# Patient Record
Sex: MALE | Race: WHITE | Employment: OTHER | URBAN - METROPOLITAN AREA
[De-identification: names, ages, dates, MRNs, and addresses within clinical notes are randomized per-mention and may not be internally consistent; named-entity substitution may affect disease eponyms.]

---

## 2022-10-15 ENCOUNTER — HOSPITAL ENCOUNTER (EMERGENCY)
Age: 67
Discharge: HOME | End: 2022-10-15
Attending: EMERGENCY MEDICINE
Payer: COMMERCIAL

## 2022-10-15 ENCOUNTER — APPOINTMENT (OUTPATIENT)
Dept: GENERAL RADIOLOGY | Age: 67
End: 2022-10-15
Payer: COMMERCIAL

## 2022-10-15 VITALS
HEIGHT: 69 IN | HEART RATE: 87 BPM | DIASTOLIC BLOOD PRESSURE: 79 MMHG | WEIGHT: 180 LBS | SYSTOLIC BLOOD PRESSURE: 96 MMHG | BODY MASS INDEX: 26.66 KG/M2 | TEMPERATURE: 98.5 F | OXYGEN SATURATION: 98 % | RESPIRATION RATE: 16 BRPM

## 2022-10-15 DIAGNOSIS — N18.9 CHRONIC KIDNEY DISEASE, UNSPECIFIED CKD STAGE: ICD-10-CM

## 2022-10-15 DIAGNOSIS — R00.0 SINUS TACHYCARDIA BY ELECTROCARDIOGRAM: ICD-10-CM

## 2022-10-15 DIAGNOSIS — R00.2 PALPITATIONS: Primary | ICD-10-CM

## 2022-10-15 LAB
ALBUMIN SERPL-MCNC: 3.8 GM/DL (ref 3.4–5)
ALP BLD-CCNC: 44 IU/L (ref 40–129)
ALT SERPL-CCNC: 14 U/L (ref 10–40)
ANION GAP SERPL CALCULATED.3IONS-SCNC: 12 MMOL/L (ref 4–16)
AST SERPL-CCNC: 21 IU/L (ref 15–37)
BASOPHILS ABSOLUTE: 0.1 K/CU MM
BASOPHILS RELATIVE PERCENT: 0.9 % (ref 0–1)
BILIRUB SERPL-MCNC: 0.3 MG/DL (ref 0–1)
BUN BLDV-MCNC: 45 MG/DL (ref 6–23)
CALCIUM SERPL-MCNC: 9.5 MG/DL (ref 8.3–10.6)
CHLORIDE BLD-SCNC: 101 MMOL/L (ref 99–110)
CO2: 22 MMOL/L (ref 21–32)
CREAT SERPL-MCNC: 1.9 MG/DL (ref 0.9–1.3)
D DIMER: 251 NG/ML(DDU)
DIFFERENTIAL TYPE: ABNORMAL
EOSINOPHILS ABSOLUTE: 0.2 K/CU MM
EOSINOPHILS RELATIVE PERCENT: 2.6 % (ref 0–3)
GFR AFRICAN AMERICAN: 43 ML/MIN/1.73M2
GFR NON-AFRICAN AMERICAN: 35 ML/MIN/1.73M2
GLUCOSE BLD-MCNC: 178 MG/DL (ref 70–99)
HCT VFR BLD CALC: 35.4 % (ref 42–52)
HEMOGLOBIN: 11.6 GM/DL (ref 13.5–18)
IMMATURE NEUTROPHIL %: 0.3 % (ref 0–0.43)
LYMPHOCYTES ABSOLUTE: 1.7 K/CU MM
LYMPHOCYTES RELATIVE PERCENT: 25.7 % (ref 24–44)
MAGNESIUM: 2.5 MG/DL (ref 1.8–2.4)
MCH RBC QN AUTO: 31.3 PG (ref 27–31)
MCHC RBC AUTO-ENTMCNC: 32.8 % (ref 32–36)
MCV RBC AUTO: 95.4 FL (ref 78–100)
MONOCYTES ABSOLUTE: 0.5 K/CU MM
MONOCYTES RELATIVE PERCENT: 7.8 % (ref 0–4)
NUCLEATED RBC %: 0 %
PDW BLD-RTO: 14.6 % (ref 11.7–14.9)
PLATELET # BLD: 207 K/CU MM (ref 140–440)
PMV BLD AUTO: 11.3 FL (ref 7.5–11.1)
POTASSIUM SERPL-SCNC: 4.2 MMOL/L (ref 3.5–5.1)
RBC # BLD: 3.71 M/CU MM (ref 4.6–6.2)
SEGMENTED NEUTROPHILS ABSOLUTE COUNT: 4 K/CU MM
SEGMENTED NEUTROPHILS RELATIVE PERCENT: 62.7 % (ref 36–66)
SODIUM BLD-SCNC: 135 MMOL/L (ref 135–145)
T4 FREE: 1.26 NG/DL (ref 0.9–1.8)
TOTAL CK: 66 IU/L (ref 38–174)
TOTAL IMMATURE NEUTOROPHIL: 0.02 K/CU MM
TOTAL NUCLEATED RBC: 0 K/CU MM
TOTAL PROTEIN: 6.9 GM/DL (ref 6.4–8.2)
TROPONIN T: <0.01 NG/ML
TSH HIGH SENSITIVITY: 2.67 UIU/ML (ref 0.27–4.2)
WBC # BLD: 6.4 K/CU MM (ref 4–10.5)

## 2022-10-15 PROCEDURE — 99285 EMERGENCY DEPT VISIT HI MDM: CPT

## 2022-10-15 PROCEDURE — 80053 COMPREHEN METABOLIC PANEL: CPT

## 2022-10-15 PROCEDURE — 85025 COMPLETE CBC W/AUTO DIFF WBC: CPT

## 2022-10-15 PROCEDURE — 83735 ASSAY OF MAGNESIUM: CPT

## 2022-10-15 PROCEDURE — 84443 ASSAY THYROID STIM HORMONE: CPT

## 2022-10-15 PROCEDURE — 82550 ASSAY OF CK (CPK): CPT

## 2022-10-15 PROCEDURE — 84439 ASSAY OF FREE THYROXINE: CPT

## 2022-10-15 PROCEDURE — 71046 X-RAY EXAM CHEST 2 VIEWS: CPT

## 2022-10-15 PROCEDURE — 85379 FIBRIN DEGRADATION QUANT: CPT

## 2022-10-15 PROCEDURE — 93005 ELECTROCARDIOGRAM TRACING: CPT | Performed by: PHYSICIAN ASSISTANT

## 2022-10-15 PROCEDURE — 84484 ASSAY OF TROPONIN QUANT: CPT

## 2022-10-15 RX ORDER — 0.9 % SODIUM CHLORIDE 0.9 %
1000 INTRAVENOUS SOLUTION INTRAVENOUS ONCE
Status: DISCONTINUED | OUTPATIENT
Start: 2022-10-15 | End: 2022-10-15 | Stop reason: HOSPADM

## 2022-10-15 NOTE — ED TRIAGE NOTES
Patient comes to the ED stating that today he experienced incidents of irregular heartbeat today. Patient denies any chest pain or tightness but states that he does get short of breath and have occasional dizziness/lightheadedness.

## 2022-10-15 NOTE — ED PROVIDER NOTES
I independently examined and evaluated Faith Allen. I personally saw the patient and performed a substantive portion of the visit including all aspects of the medical decision making. In brief their history revealed patient has been dealing with episodes of rapid heart rate for the past several months. Patient reports that occasionally he will feel his heartbeat very rapidly followed by some pauses and forceful heartbeats. These episodes can occur randomly and are not brought on by anything specifically that he is aware of. Patient does experience occasional lightheadedness with these but no chest pain. No shortness of breath currently but occasionally with the episodes as well. Patient reports that this evening she was feeling lightheaded with the rapid heart beating and he mentioned this to his wife who had never discussed this with before and she brought him in for evaluation. Patient does report that his symptoms before arrival improved with smoking a cigarette. Patient denies any significant caffeine use other than 2 cups of coffee in the morning. No substance abuse issues. No recent illnesses. Patient does follow with a cardiologist although has been number of years. Their focused exam revealed the patient is afebrile, tachycardic otherwise hemodynamically stable on room air. The patient appears age appropriate, appears well-hydrated, well-nourished. Sitting upright in bed. Very pleasant. [address] injury. Mucous membranes are moist. Speech is clear. Breathing is unlabored. Speaking clearly in full sentences. No respiratory distress. Skin is dry. Mental status is normal. The patient moves all extremities and is without facial droop. No bilateral lower extremity edema. No calf tenderness to palpation. Strong symmetric bilateral radial and PT pulses.     Results for orders placed or performed during the hospital encounter of 10/15/22   CBC with Auto Differential   Result Value Ref Range    WBC 6.4 4.0 - 10.5 K/CU MM    RBC 3.71 (L) 4.6 - 6.2 M/CU MM    Hemoglobin 11.6 (L) 13.5 - 18.0 GM/DL    Hematocrit 35.4 (L) 42 - 52 %    MCV 95.4 78 - 100 FL    MCH 31.3 (H) 27 - 31 PG    MCHC 32.8 32.0 - 36.0 %    RDW 14.6 11.7 - 14.9 %    Platelets 440 258 - 178 K/CU MM    MPV 11.3 (H) 7.5 - 11.1 FL    Differential Type AUTOMATED DIFFERENTIAL     Segs Relative 62.7 36 - 66 %    Lymphocytes % 25.7 24 - 44 %    Monocytes % 7.8 (H) 0 - 4 %    Eosinophils % 2.6 0 - 3 %    Basophils % 0.9 0 - 1 %    Segs Absolute 4.0 K/CU MM    Lymphocytes Absolute 1.7 K/CU MM    Monocytes Absolute 0.5 K/CU MM    Eosinophils Absolute 0.2 K/CU MM    Basophils Absolute 0.1 K/CU MM    Nucleated RBC % 0.0 %    Total Nucleated RBC 0.0 K/CU MM    Total Immature Neutrophil 0.02 K/CU MM    Immature Neutrophil % 0.3 0 - 0.43 %   Comprehensive Metabolic Panel   Result Value Ref Range    Sodium 135 135 - 145 MMOL/L    Potassium 4.2 3.5 - 5.1 MMOL/L    Chloride 101 99 - 110 mMol/L    CO2 22 21 - 32 MMOL/L    BUN 45 (H) 6 - 23 MG/DL    Creatinine 1.9 (H) 0.9 - 1.3 MG/DL    Glucose 178 (H) 70 - 99 MG/DL    Calcium 9.5 8.3 - 10.6 MG/DL    Albumin 3.8 3.4 - 5.0 GM/DL    Total Protein 6.9 6.4 - 8.2 GM/DL    Total Bilirubin 0.3 0.0 - 1.0 MG/DL    ALT 14 10 - 40 U/L    AST 21 15 - 37 IU/L    Alkaline Phosphatase 44 40 - 129 IU/L    GFR Non- 35 (L) >60 mL/min/1.73m2    GFR  43 (L) >60 mL/min/1.73m2    Anion Gap 12 4 - 16   Troponin   Result Value Ref Range    Troponin T <0.010 <0.01 NG/ML   Magnesium   Result Value Ref Range    Magnesium 2.5 (H) 1.8 - 2.4 mg/dl   CK   Result Value Ref Range    Total CK 66 38 - 174 IU/L   D-Dimer, Quantitative   Result Value Ref Range    D-Dimer, Quant 251 (H) <230 NG/mL(DDU)   T4, Free   Result Value Ref Range    T4 Free 1.26 0.9 - 1.8 NG/DL   TSH   Result Value Ref Range    TSH, High Sensitivity 2.670 0.270 - 4.20 uIu/ml   EKG 12 Lead   Result Value Ref Range Ventricular Rate 109 BPM    Atrial Rate 109 BPM    P-R Interval 134 ms    QRS Duration 68 ms    Q-T Interval 300 ms    QTc Calculation (Bazett) 404 ms    P Axis 85 degrees    R Axis 73 degrees    T Axis 77 degrees    Diagnosis       Sinus tachycardia  Right atrial enlargement  Borderline ECG  No previous ECGs available           12 lead EKG per my interpretation:  Sinus Tachycardia at 109  Axis is   Normal  QTc is  normal  There is no specific T wave changes appreciated. There is no specific ST wave changes appreciated. No STEMI    Prior EKG to compare with was NOT available. ED course: Pt presents as above. Emergent conditions considered. Presentation prompted initial broad work-up with labs, EKG and imaging. EKG with sinus tachycardia as above. CBC is with a mild anemia. No leukocytosis. D-dimer is elevated at 251 however this is not suggestive of thromboembolic disease per age-adjusted criteria. CMP is without clinically significant derangement other than mild hyperglycemia without metabolic acidosis. The patient does have chronic kidney disease with BUN of 45 and creatinine of 1.9 with prior creatinine several years ago was at 1.7 per care everywhere. Magnesium is marginally elevated. TSH and free T4 within normal limits. Patient is with normal heart rate on rechecks here in the emergency department. No dysrhythmias on telemetry monitoring. I do believe patient would benefit from Holter monitoring with suspicion for either paroxysmal atrial fibrillation or frequent PVCs/PACs. Both of these processes are discussed with patient as well as the need for follow-up with cardiology and he and his wife are agreeable with this plan. I did encourage patient to limit any caffeine use or any stimulant use. Smoking cessation counseling also provided.   Encourage patient to monitor symptoms closely and return to the emergency department for any chest pain or increasing shortness of breath or other new concerning symptoms. Questions sought and answered with the patient and wife. They voice understanding and agree with plan. Instructed to return for any worsening or worrisome concerns. Is this patient to be included in the SEP-1 Core Measure due to severe sepsis or septic shock? No   Exclusion criteria - the patient is NOT to be included for SEP-1 Core Measure due to: Infection is not suspected        All decisions regarding differential diagnosis, lab/radiology/EKG interpretation, risk of significant illness, specific reasons for performing tests, management/treatment, response to management/treatment, disposition, reasons for consults, results of consults, etc. were made by myself in conjunction with the Advanced Practice Provider. For all further details of the patient's emergency department visit, please see the Advanced Practice Provider's documentation.        Susanne Carrero MD  10/15/22 3005

## 2022-10-15 NOTE — ED PROVIDER NOTES
[address] ENCOUNTER        Pt Name: Aydin Trujillo  MRN: [id number]  [place] 1955  Date of evaluation: 10/15/2022  Provider: KAREN Molina CNP  PCP: No primary care provider on file. I have seen and evaluated this patient with my supervising physician Tim Kennedy MD.      Mert ABBOTT [address].       Chief Complaint   Patient presents with    Irregular Heart Beat     States he has had an irregular heart beat all day         HISTORY OF PRESENT ILLNESS      Chief Complaint: Rapid heart rate    Aydin Trujillo is a 69 y.o. male who presents for evaluation of intermittent palpitations that have been occurring this afternoon. Patient reports he felt like his heart was beating very rapidly and noticed on his apple watch that his heart rate was going up to 130. He states that it lasted about 1 hour and then drifted down but continued to be over 100. He reports some mild associated shortness of breath but denies any chest pain, dizziness, lightheadedness. He reports he is otherwise been feeling well recently and has had no other symptoms of illness. He has no prior cardiac history. He had a negative stress test last 3 years ago. He had 1 prior episode of similar symptoms about 6 weeks ago that resolved spontaneously. Nursing Notes were all reviewed and agreed with or any disagreements were addressed in the HPI.     REVIEW OF SYSTEMS     Constitutional:   Denies fever, chills, weight loss or weakness   HENT:   Denies sore throat or ear pain   Cardiovascular:   Denies chest pain, + palpitations   Respiratory:  Denies cough, + mild shortness of breath    GI:   Denies abdominal pain, nausea, vomiting, or diarrhea  :  Denies any urinary symptoms   Musculoskeletal:   Denies neck, back, or extremity pain  Skin:   Denies rash  Neurologic:   Denies headache, focal weakness or sensory changes   Endocrine:  Denies polyuria or polydypsia   Lymphatic:  Denies swollen glands     PAST MEDICAL HISTORY     Past Medical History:   Diagnosis Date    Diabetes mellitus ([facility] [address]        SURGICAL HISTORY   History reviewed. No pertinent surgical history. CURRENTMEDICATIONS       Previous Medications    No medications on file       ALLERGIES     Pcn [penicillins]    FAMILYHISTORY     History reviewed. No pertinent family history. SOCIAL HISTORY       Social History     Socioeconomic History    Marital status:      Spouse name: None    Number of children: None    Years of education: None    Highest education level: None   Tobacco Use    Smoking status: Every Day     Packs/day: 1.50     Types: Cigarettes   Substance and Sexual Activity    Alcohol use: Never    Drug use: Never       SCREENINGS    [place] Coma Scale  Eye Opening: Spontaneous  Best Verbal Response: Oriented  Best Motor Response: Obeys commands  Kimberly Coma Scale Score: 15      PHYSICAL EXAM       ED Triage Vitals   BP Temp Temp Source Heart Rate Resp SpO2 Height Weight   10/15/22 1811 10/15/22 1811 10/15/22 1811 10/15/22 1811 10/15/22 1811 10/15/22 1811 10/15/22 1846 10/15/22 1846   (!) 141/64 98.5 °F (36.9 °C) Oral (!) 111 19 97 % 5' 9\" (1.753 m) 180 lb (81.6 kg)      Constitutional:  Well developed, Well nourished. No distress  HENT:  Normocephalic, Atraumatic, Moist mucus membranes. Neck/Lymphatics: supple, no JVD, no swollen nodes, no carotid bruit  Cardiovascular:   Tachycardic, RRR,  no murmurs/rubs/gallops. Distal cap refill and pulses intact bilateral upper and lower extremities. no peripheral edema. Respiratory:   Nonlabored breathing. Normal breath sounds, No wheezing  Abdomen: Bowel sounds normal, Soft, No tenderness, no masses. Musculoskeletal:  Bilateral upper and lower extremity ROM intact without pain or obvious deficit  Integument:   Warm, Dry, No rashes. Neurologic:  Alert & oriented , No focal deficits noted.  Sensation intact. Psychiatric:  Affect normal, Mood normal.     DIAGNOSTIC RESULTS   LABS:    Labs Reviewed   CBC WITH AUTO DIFFERENTIAL - Abnormal; Notable for the following components:       Result Value    RBC 3.71 (*)     Hemoglobin 11.6 (*)     Hematocrit 35.4 (*)     MCH 31.3 (*)     MPV 11.3 (*)     Monocytes % 7.8 (*)     All other components within normal limits   COMPREHENSIVE METABOLIC PANEL - Abnormal; Notable for the following components:    BUN 45 (*)     Creatinine 1.9 (*)     Glucose 178 (*)     GFR Non- 35 (*)     GFR  43 (*)     All other components within normal limits   MAGNESIUM - Abnormal; Notable for the following components:    Magnesium 2.5 (*)     All other components within normal limits   D-DIMER, QUANTITATIVE - Abnormal; Notable for the following components:    D-Dimer, Quant 251 (*)     All other components within normal limits   TROPONIN   CK   T4, FREE   TSH       When ordered, only abnormal lab results are displayed. All other labs were within normal range or not returned as of this dictation. EKG: When ordered, EKG's are interpreted by the Emergency Department Physician in the absence of a cardiologist.  Please see their note for interpretation of EKG. RADIOLOGY:   Non-plain film images such as CT, Ultrasound and MRI are read by the radiologist. Plain radiographic images are visualized and preliminarily interpreted by the  ED Provider with the below findings:    Interpretation perthe Radiologist below, if available at the time of this note:    XR CHEST (2 VW)   Final Result   No acute process. XR CHEST (2 VW)    Result Date: 10/15/2022  EXAMINATION: TWO XRAY VIEWS OF THE CHEST 10/15/2022 7:02 pm COMPARISON: None.  HISTORY: ORDERING SYSTEM PROVIDED HISTORY: tachycardia, mild SOB TECHNOLOGIST PROVIDED HISTORY: Reason for exam:->tachycardia, mild SOB Reason for Exam: tachycardia Additional signs and symptoms: mild sob FINDINGS: The lungs are without acute focal process. There is no effusion or pneumothorax. The cardiomediastinal silhouette is without acute process. The osseous structures are without acute process. No acute process. PROCEDURES   Unless otherwise noted below, none         CRITICAL CARE   CRITICAL CARE NOTE:  N/A    CONSULTS:  None      EMERGENCY DEPARTMENT COURSE and MDM:   Vitals:    Vitals:    10/15/22 1811 10/15/22 1846 10/15/22 1946 10/15/22 2003   BP: (!) 141/64  108/61 96/79   Pulse: (!) 111  83 87   Resp: 19 22 16   Temp: 98.5 °F (36.9 °C)      TempSrc: Oral      SpO2: 97%  100% 98%   Weight:  180 lb (81.6 kg)     Height:  5' 9\" (1.753 m)         Patient was given thefollowing medications:  Medications   0.9 % sodium chloride bolus (1,000 mLs IntraVENous Not Given 10/15/22 1952)         Is this patient to be included in the SEP-1 Core Measure due to severe sepsis or septic shock? No   Exclusion criteria - the patient is NOT to be included for SEP-1 Core Measure due to: Infection is not suspected    MDM:  Patient presents as above. Emergent etiologies considered. Patient seen and examined. Work-up initiated secondary to presentation, physical exam findings, vital signs and medical chart review. In brief, patient presents for evaluation of intermittent palpitations and tachycardia. Through his visit, his heart rate was variable between 80s to 140s. On discharge he was consistently in the [de-identified]. His laboratory studies including a CMP, CBC significant for creatinine of 1.9 and BUN of 45 which appear to be slightly elevated from his last recorded labs in 2016 at [facility]. His troponin was negative. D-dimer was mildly elevated but less than age-adjusted normal.  TSH and T4 were normal.    An ECG demonstrated sinus tachycardia with no signs concerning for ACS-please see attending interpretation for full details. Chest x-ray was unremarkable. Fluids were ordered but the patient declined.   Discussed results with the

## 2022-10-16 NOTE — ED NOTES
Pt discharged from emergency department with all necessary paperwork     Discharge information reviewed and all questions answered.           Courtney Kinsey RN  10/15/22 2013

## 2022-10-16 NOTE — DISCHARGE INSTRUCTIONS
As we discussed, if you develop any recurrent persistent elevated heart rate that is not resolving at home or is associated with chest pain, shortness of breath, dizziness or feeling like you might pass out, return to the ED immediately for reevaluation.

## 2022-10-18 LAB
EKG ATRIAL RATE: 109 BPM
EKG DIAGNOSIS: NORMAL
EKG P AXIS: 85 DEGREES
EKG P-R INTERVAL: 134 MS
EKG Q-T INTERVAL: 300 MS
EKG QRS DURATION: 68 MS
EKG QTC CALCULATION (BAZETT): 404 MS
EKG R AXIS: 73 DEGREES
EKG T AXIS: 77 DEGREES
EKG VENTRICULAR RATE: 109 BPM

## 2022-10-18 PROCEDURE — 93010 ELECTROCARDIOGRAM REPORT: CPT | Performed by: INTERNAL MEDICINE

## 2022-12-29 ENCOUNTER — HOSPITAL ENCOUNTER (INPATIENT)
Age: 67
LOS: 3 days | Discharge: HOME | DRG: 177 | End: 2023-01-02
Attending: STUDENT IN AN ORGANIZED HEALTH CARE EDUCATION/TRAINING PROGRAM | Admitting: STUDENT IN AN ORGANIZED HEALTH CARE EDUCATION/TRAINING PROGRAM
Payer: COMMERCIAL

## 2022-12-29 ENCOUNTER — APPOINTMENT (OUTPATIENT)
Dept: GENERAL RADIOLOGY | Age: 67
DRG: 177 | End: 2022-12-29
Payer: COMMERCIAL

## 2022-12-29 DIAGNOSIS — N17.9 AKI (ACUTE KIDNEY INJURY) (HCC): ICD-10-CM

## 2022-12-29 DIAGNOSIS — U07.1 COVID: ICD-10-CM

## 2022-12-29 DIAGNOSIS — D64.9 ANEMIA, UNSPECIFIED TYPE: ICD-10-CM

## 2022-12-29 DIAGNOSIS — K92.2 GASTROINTESTINAL HEMORRHAGE, UNSPECIFIED GASTROINTESTINAL HEMORRHAGE TYPE: ICD-10-CM

## 2022-12-29 DIAGNOSIS — R09.02 HYPOXIA: Primary | ICD-10-CM

## 2022-12-29 LAB
ABO/RH: NORMAL
ADENOVIRUS DETECTION BY PCR: NOT DETECTED
ALBUMIN SERPL-MCNC: 2.9 GM/DL (ref 3.4–5)
ALP BLD-CCNC: 35 IU/L (ref 40–128)
ALT SERPL-CCNC: 17 U/L (ref 10–40)
ANION GAP SERPL CALCULATED.3IONS-SCNC: 10 MMOL/L (ref 4–16)
ANTIBODY SCREEN: NEGATIVE
AST SERPL-CCNC: 26 IU/L (ref 15–37)
BASOPHILS ABSOLUTE: 0 K/CU MM
BASOPHILS RELATIVE PERCENT: 0.2 % (ref 0–1)
BILIRUB SERPL-MCNC: 0.4 MG/DL (ref 0–1)
BORDETELLA PARAPERTUSSIS BY PCR: NOT DETECTED
BORDETELLA PERTUSSIS PCR: NOT DETECTED
BUN BLDV-MCNC: 29 MG/DL (ref 6–23)
CALCIUM SERPL-MCNC: 8.6 MG/DL (ref 8.3–10.6)
CHLAMYDOPHILA PNEUMONIA PCR: NOT DETECTED
CHLORIDE BLD-SCNC: 101 MMOL/L (ref 99–110)
CO2: 23 MMOL/L (ref 21–32)
CORONAVIRUS 229E PCR: NOT DETECTED
CORONAVIRUS HKU1 PCR: NOT DETECTED
CORONAVIRUS NL63 PCR: NOT DETECTED
CORONAVIRUS OC43 PCR: NOT DETECTED
CREAT SERPL-MCNC: 1.4 MG/DL (ref 0.9–1.3)
DIFFERENTIAL TYPE: ABNORMAL
EOSINOPHILS ABSOLUTE: 0 K/CU MM
EOSINOPHILS RELATIVE PERCENT: 0.9 % (ref 0–3)
GFR SERPL CREATININE-BSD FRML MDRD: 54 ML/MIN/1.73M2
GLUCOSE BLD-MCNC: 167 MG/DL (ref 70–99)
HCT VFR BLD CALC: 24.8 % (ref 42–52)
HEMOGLOBIN: 7.9 GM/DL (ref 13.5–18)
HUMAN METAPNEUMOVIRUS PCR: NOT DETECTED
IMMATURE NEUTROPHIL %: 0.7 % (ref 0–0.43)
INFLUENZA A BY PCR: NOT DETECTED
INFLUENZA A H1 (2009) PCR: NOT DETECTED
INFLUENZA A H1 PANDEMIC PCR: NOT DETECTED
INFLUENZA A H3 PCR: NOT DETECTED
INFLUENZA B BY PCR: NOT DETECTED
LYMPHOCYTES ABSOLUTE: 0.8 K/CU MM
LYMPHOCYTES RELATIVE PERCENT: 17.7 % (ref 24–44)
MCH RBC QN AUTO: 31 PG (ref 27–31)
MCHC RBC AUTO-ENTMCNC: 31.9 % (ref 32–36)
MCV RBC AUTO: 97.3 FL (ref 78–100)
MONOCYTES ABSOLUTE: 0.4 K/CU MM
MONOCYTES RELATIVE PERCENT: 9.2 % (ref 0–4)
MYCOPLASMA PNEUMONIAE PCR: NOT DETECTED
NUCLEATED RBC %: 0 %
PARAINFLUENZA 1 PCR: NOT DETECTED
PARAINFLUENZA 2 PCR: NOT DETECTED
PARAINFLUENZA 3 PCR: NOT DETECTED
PARAINFLUENZA 4 PCR: NOT DETECTED
PDW BLD-RTO: 14.3 % (ref 11.7–14.9)
PLATELET # BLD: 151 K/CU MM (ref 140–440)
PMV BLD AUTO: 11.8 FL (ref 7.5–11.1)
POTASSIUM SERPL-SCNC: 4.5 MMOL/L (ref 3.5–5.1)
PRO-BNP: 983.1 PG/ML
RBC # BLD: 2.55 M/CU MM (ref 4.6–6.2)
RHINOVIRUS ENTEROVIRUS PCR: NOT DETECTED
RSV PCR: NOT DETECTED
SARS-COV-2: ABNORMAL
SEGMENTED NEUTROPHILS ABSOLUTE COUNT: 3.1 K/CU MM
SEGMENTED NEUTROPHILS RELATIVE PERCENT: 71.3 % (ref 36–66)
SODIUM BLD-SCNC: 134 MMOL/L (ref 135–145)
TOTAL IMMATURE NEUTOROPHIL: 0.03 K/CU MM
TOTAL NUCLEATED RBC: 0 K/CU MM
TOTAL PROTEIN: 5.9 GM/DL (ref 6.4–8.2)
TROPONIN T: <0.01 NG/ML
WBC # BLD: 4.4 K/CU MM (ref 4–10.5)

## 2022-12-29 PROCEDURE — 85025 COMPLETE CBC W/AUTO DIFF WBC: CPT

## 2022-12-29 PROCEDURE — 86900 BLOOD TYPING SEROLOGIC ABO: CPT

## 2022-12-29 PROCEDURE — 71045 X-RAY EXAM CHEST 1 VIEW: CPT

## 2022-12-29 PROCEDURE — 99285 EMERGENCY DEPT VISIT HI MDM: CPT

## 2022-12-29 PROCEDURE — 6360000002 HC RX W HCPCS: Performed by: PHYSICIAN ASSISTANT

## 2022-12-29 PROCEDURE — C9113 INJ PANTOPRAZOLE SODIUM, VIA: HCPCS | Performed by: PHYSICIAN ASSISTANT

## 2022-12-29 PROCEDURE — 94640 AIRWAY INHALATION TREATMENT: CPT

## 2022-12-29 PROCEDURE — 93005 ELECTROCARDIOGRAM TRACING: CPT | Performed by: STUDENT IN AN ORGANIZED HEALTH CARE EDUCATION/TRAINING PROGRAM

## 2022-12-29 PROCEDURE — 87040 BLOOD CULTURE FOR BACTERIA: CPT

## 2022-12-29 PROCEDURE — 6370000000 HC RX 637 (ALT 250 FOR IP): Performed by: PHYSICIAN ASSISTANT

## 2022-12-29 PROCEDURE — 2580000003 HC RX 258: Performed by: PHYSICIAN ASSISTANT

## 2022-12-29 PROCEDURE — 2700000000 HC OXYGEN THERAPY PER DAY

## 2022-12-29 PROCEDURE — 0202U NFCT DS 22 TRGT SARS-COV-2: CPT

## 2022-12-29 PROCEDURE — 84484 ASSAY OF TROPONIN QUANT: CPT

## 2022-12-29 PROCEDURE — 83880 ASSAY OF NATRIURETIC PEPTIDE: CPT

## 2022-12-29 PROCEDURE — 86850 RBC ANTIBODY SCREEN: CPT

## 2022-12-29 PROCEDURE — 96374 THER/PROPH/DIAG INJ IV PUSH: CPT

## 2022-12-29 PROCEDURE — 80053 COMPREHEN METABOLIC PANEL: CPT

## 2022-12-29 PROCEDURE — 86901 BLOOD TYPING SEROLOGIC RH(D): CPT

## 2022-12-29 PROCEDURE — 96375 TX/PRO/DX INJ NEW DRUG ADDON: CPT

## 2022-12-29 RX ORDER — SODIUM CHLORIDE 9 MG/ML
INJECTION, SOLUTION INTRAVENOUS CONTINUOUS
Status: DISCONTINUED | OUTPATIENT
Start: 2022-12-29 | End: 2022-12-30

## 2022-12-29 RX ORDER — IPRATROPIUM BROMIDE AND ALBUTEROL SULFATE 2.5; .5 MG/3ML; MG/3ML
1 SOLUTION RESPIRATORY (INHALATION) ONCE
Status: COMPLETED | OUTPATIENT
Start: 2022-12-29 | End: 2022-12-29

## 2022-12-29 RX ORDER — PANTOPRAZOLE SODIUM 40 MG/10ML
40 INJECTION, POWDER, LYOPHILIZED, FOR SOLUTION INTRAVENOUS ONCE
Status: COMPLETED | OUTPATIENT
Start: 2022-12-29 | End: 2022-12-29

## 2022-12-29 RX ORDER — METHYLPREDNISOLONE SODIUM SUCCINATE 125 MG/2ML
125 INJECTION, POWDER, LYOPHILIZED, FOR SOLUTION INTRAMUSCULAR; INTRAVENOUS ONCE
Status: COMPLETED | OUTPATIENT
Start: 2022-12-29 | End: 2022-12-29

## 2022-12-29 RX ADMIN — METHYLPREDNISOLONE SODIUM SUCCINATE 125 MG: 125 INJECTION, POWDER, FOR SOLUTION INTRAMUSCULAR; INTRAVENOUS at 20:18

## 2022-12-29 RX ADMIN — PANTOPRAZOLE SODIUM 40 MG: 40 INJECTION, POWDER, FOR SOLUTION INTRAVENOUS at 23:25

## 2022-12-29 RX ADMIN — SODIUM CHLORIDE: 9 INJECTION, SOLUTION INTRAVENOUS at 20:18

## 2022-12-29 RX ADMIN — IPRATROPIUM BROMIDE AND ALBUTEROL SULFATE 1 AMPULE: .5; 2.5 SOLUTION RESPIRATORY (INHALATION) at 20:11

## 2022-12-29 NOTE — ED NOTES
Pt 91% on RA and complaining of dyspnea; pt placed onto 2L NC. O2 now 98%.      Debra Saunders, RN  12/29/22 5269

## 2022-12-30 PROBLEM — U07.1 COVID-19 VIRUS INFECTION: Status: ACTIVE | Noted: 2022-12-30

## 2022-12-30 LAB
ANION GAP SERPL CALCULATED.3IONS-SCNC: 11 MMOL/L (ref 4–16)
BACTERIA: ABNORMAL /HPF
BASOPHILS ABSOLUTE: 0 K/CU MM
BASOPHILS ABSOLUTE: 0 K/CU MM
BASOPHILS RELATIVE PERCENT: 0 % (ref 0–1)
BASOPHILS RELATIVE PERCENT: 0 % (ref 0–1)
BILIRUBIN URINE: NEGATIVE MG/DL
BLOOD, URINE: ABNORMAL
BUN BLDV-MCNC: 29 MG/DL (ref 6–23)
C-REACTIVE PROTEIN, HIGH SENSITIVITY: 132.2 MG/L
CALCIUM SERPL-MCNC: 8.1 MG/DL (ref 8.3–10.6)
CHLORIDE BLD-SCNC: 103 MMOL/L (ref 99–110)
CLARITY: CLEAR
CO2: 20 MMOL/L (ref 21–32)
COLOR: YELLOW
CREAT SERPL-MCNC: 1.3 MG/DL (ref 0.9–1.3)
CREATININE URINE: 4.2 MG/DL (ref 39–259)
DIFFERENTIAL TYPE: ABNORMAL
DIFFERENTIAL TYPE: ABNORMAL
EOSINOPHILS ABSOLUTE: 0 K/CU MM
EOSINOPHILS ABSOLUTE: 0 K/CU MM
EOSINOPHILS RELATIVE PERCENT: 0 % (ref 0–3)
EOSINOPHILS RELATIVE PERCENT: 0 % (ref 0–3)
ESTIMATED AVERAGE GLUCOSE: 140 MG/DL
FERRITIN: 354 NG/ML (ref 30–400)
FOLATE: 10.5 NG/ML (ref 3.1–17.5)
GFR SERPL CREATININE-BSD FRML MDRD: 59 ML/MIN/1.73M2
GLUCOSE BLD-MCNC: 107 MG/DL (ref 70–99)
GLUCOSE BLD-MCNC: 329 MG/DL (ref 70–99)
GLUCOSE BLD-MCNC: 350 MG/DL (ref 70–99)
GLUCOSE BLD-MCNC: 371 MG/DL (ref 70–99)
GLUCOSE BLD-MCNC: 391 MG/DL (ref 70–99)
GLUCOSE BLD-MCNC: 454 MG/DL (ref 70–99)
GLUCOSE, URINE: 250 MG/DL
HBA1C MFR BLD: 6.5 % (ref 4.2–6.3)
HCT VFR BLD CALC: 24.2 % (ref 42–52)
HCT VFR BLD CALC: 25.7 % (ref 42–52)
HEMOGLOBIN: 7.8 GM/DL (ref 13.5–18)
HEMOGLOBIN: 8 GM/DL (ref 13.5–18)
IMMATURE NEUTROPHIL %: 0.4 % (ref 0–0.43)
IMMATURE NEUTROPHIL %: 0.7 % (ref 0–0.43)
INR BLD: 1.28 INDEX
IRON: 24 UG/DL (ref 59–158)
KETONES, URINE: ABNORMAL MG/DL
LACTATE: 0.8 MMOL/L (ref 0.5–1.9)
LEUKOCYTE ESTERASE, URINE: NEGATIVE
LYMPHOCYTES ABSOLUTE: 0.4 K/CU MM
LYMPHOCYTES ABSOLUTE: 0.4 K/CU MM
LYMPHOCYTES RELATIVE PERCENT: 7.8 % (ref 24–44)
LYMPHOCYTES RELATIVE PERCENT: 8.9 % (ref 24–44)
MCH RBC QN AUTO: 30.8 PG (ref 27–31)
MCH RBC QN AUTO: 31.2 PG (ref 27–31)
MCHC RBC AUTO-ENTMCNC: 31.1 % (ref 32–36)
MCHC RBC AUTO-ENTMCNC: 32.2 % (ref 32–36)
MCV RBC AUTO: 96.8 FL (ref 78–100)
MCV RBC AUTO: 98.8 FL (ref 78–100)
MONOCYTES ABSOLUTE: 0.1 K/CU MM
MONOCYTES ABSOLUTE: 0.2 K/CU MM
MONOCYTES RELATIVE PERCENT: 3.3 % (ref 0–4)
MONOCYTES RELATIVE PERCENT: 3.3 % (ref 0–4)
MUCUS: ABNORMAL HPF
NITRITE URINE, QUANTITATIVE: NEGATIVE
NUCLEATED RBC %: 0 %
NUCLEATED RBC %: 0 %
PCT TRANSFERRIN: 10 % (ref 10–44)
PDW BLD-RTO: 14.4 % (ref 11.7–14.9)
PDW BLD-RTO: 14.5 % (ref 11.7–14.9)
PH, URINE: 5.5 (ref 5–8)
PLATELET # BLD: 165 K/CU MM (ref 140–440)
PLATELET # BLD: 177 K/CU MM (ref 140–440)
PMV BLD AUTO: 11.2 FL (ref 7.5–11.1)
PMV BLD AUTO: 11.5 FL (ref 7.5–11.1)
POTASSIUM SERPL-SCNC: 4.9 MMOL/L (ref 3.5–5.1)
PROCALCITONIN: 0.09
PROT/CREAT RATIO, UR: 1
PROTEIN UA: NEGATIVE MG/DL
PROTHROMBIN TIME: 16.6 SECONDS (ref 11.7–14.5)
RBC # BLD: 2.5 M/CU MM (ref 4.6–6.2)
RBC # BLD: 2.6 M/CU MM (ref 4.6–6.2)
RBC URINE: <1 /HPF (ref 0–3)
RETICULOCYTE COUNT PCT: 2 % (ref 0.2–2.2)
SEGMENTED NEUTROPHILS ABSOLUTE COUNT: 3.7 K/CU MM
SEGMENTED NEUTROPHILS ABSOLUTE COUNT: 4.8 K/CU MM
SEGMENTED NEUTROPHILS RELATIVE PERCENT: 87.1 % (ref 36–66)
SEGMENTED NEUTROPHILS RELATIVE PERCENT: 88.5 % (ref 36–66)
SODIUM BLD-SCNC: 134 MMOL/L (ref 135–145)
SPECIFIC GRAVITY UA: 1.01 (ref 1–1.03)
SQUAMOUS EPITHELIAL: 1 /HPF
TOTAL IMMATURE NEUTOROPHIL: 0.02 K/CU MM
TOTAL IMMATURE NEUTOROPHIL: 0.03 K/CU MM
TOTAL IRON BINDING CAPACITY: 244 UG/DL (ref 250–450)
TOTAL NUCLEATED RBC: 0 K/CU MM
TOTAL NUCLEATED RBC: 0 K/CU MM
UNSATURATED IRON BINDING CAPACITY: 220 UG/DL (ref 110–370)
URINE TOTAL PROTEIN: 4 MG/DL
UROBILINOGEN, URINE: 1 MG/DL (ref 0.2–1)
VITAMIN B-12: 1169 PG/ML (ref 211–911)
WBC # BLD: 4.3 K/CU MM (ref 4–10.5)
WBC # BLD: 5.4 K/CU MM (ref 4–10.5)
WBC UA: <1 /HPF (ref 0–2)

## 2022-12-30 PROCEDURE — 82607 VITAMIN B-12: CPT

## 2022-12-30 PROCEDURE — 99222 1ST HOSP IP/OBS MODERATE 55: CPT | Performed by: INTERNAL MEDICINE

## 2022-12-30 PROCEDURE — 82746 ASSAY OF FOLIC ACID SERUM: CPT

## 2022-12-30 PROCEDURE — 83605 ASSAY OF LACTIC ACID: CPT

## 2022-12-30 PROCEDURE — 82570 ASSAY OF URINE CREATININE: CPT

## 2022-12-30 PROCEDURE — 81003 URINALYSIS AUTO W/O SCOPE: CPT

## 2022-12-30 PROCEDURE — 6370000000 HC RX 637 (ALT 250 FOR IP): Performed by: STUDENT IN AN ORGANIZED HEALTH CARE EDUCATION/TRAINING PROGRAM

## 2022-12-30 PROCEDURE — 84145 PROCALCITONIN (PCT): CPT

## 2022-12-30 PROCEDURE — 6360000002 HC RX W HCPCS: Performed by: STUDENT IN AN ORGANIZED HEALTH CARE EDUCATION/TRAINING PROGRAM

## 2022-12-30 PROCEDURE — 80048 BASIC METABOLIC PNL TOTAL CA: CPT

## 2022-12-30 PROCEDURE — 83036 HEMOGLOBIN GLYCOSYLATED A1C: CPT

## 2022-12-30 PROCEDURE — 83550 IRON BINDING TEST: CPT

## 2022-12-30 PROCEDURE — 86140 C-REACTIVE PROTEIN: CPT

## 2022-12-30 PROCEDURE — 85025 COMPLETE CBC W/AUTO DIFF WBC: CPT

## 2022-12-30 PROCEDURE — 83540 ASSAY OF IRON: CPT

## 2022-12-30 PROCEDURE — 82962 GLUCOSE BLOOD TEST: CPT

## 2022-12-30 PROCEDURE — 2580000003 HC RX 258: Performed by: STUDENT IN AN ORGANIZED HEALTH CARE EDUCATION/TRAINING PROGRAM

## 2022-12-30 PROCEDURE — 36415 COLL VENOUS BLD VENIPUNCTURE: CPT

## 2022-12-30 PROCEDURE — 84156 ASSAY OF PROTEIN URINE: CPT

## 2022-12-30 PROCEDURE — 1200000000 HC SEMI PRIVATE

## 2022-12-30 PROCEDURE — 85045 AUTOMATED RETICULOCYTE COUNT: CPT

## 2022-12-30 PROCEDURE — C9113 INJ PANTOPRAZOLE SODIUM, VIA: HCPCS | Performed by: STUDENT IN AN ORGANIZED HEALTH CARE EDUCATION/TRAINING PROGRAM

## 2022-12-30 PROCEDURE — 82728 ASSAY OF FERRITIN: CPT

## 2022-12-30 PROCEDURE — 85610 PROTHROMBIN TIME: CPT

## 2022-12-30 RX ORDER — INSULIN LISPRO 100 [IU]/ML
0-4 INJECTION, SOLUTION INTRAVENOUS; SUBCUTANEOUS NIGHTLY
Status: DISCONTINUED | OUTPATIENT
Start: 2022-12-30 | End: 2022-12-31

## 2022-12-30 RX ORDER — ONDANSETRON 4 MG/1
4 TABLET, ORALLY DISINTEGRATING ORAL EVERY 8 HOURS PRN
Status: DISCONTINUED | OUTPATIENT
Start: 2022-12-30 | End: 2023-01-02 | Stop reason: HOSPADM

## 2022-12-30 RX ORDER — ROSUVASTATIN CALCIUM 10 MG/1
10 TABLET, COATED ORAL DAILY
COMMUNITY

## 2022-12-30 RX ORDER — METOPROLOL SUCCINATE 25 MG/1
25 TABLET, EXTENDED RELEASE ORAL DAILY
Status: DISCONTINUED | OUTPATIENT
Start: 2022-12-30 | End: 2023-01-02 | Stop reason: HOSPADM

## 2022-12-30 RX ORDER — DEXAMETHASONE SODIUM PHOSPHATE 10 MG/ML
6 INJECTION, SOLUTION INTRAMUSCULAR; INTRAVENOUS EVERY 24 HOURS
Status: DISCONTINUED | OUTPATIENT
Start: 2022-12-30 | End: 2022-12-31

## 2022-12-30 RX ORDER — PIOGLITAZONEHYDROCHLORIDE 45 MG/1
45 TABLET ORAL DAILY
COMMUNITY
Start: 2022-10-30

## 2022-12-30 RX ORDER — SODIUM CHLORIDE 9 MG/ML
INJECTION, SOLUTION INTRAVENOUS PRN
Status: DISCONTINUED | OUTPATIENT
Start: 2022-12-30 | End: 2023-01-02 | Stop reason: HOSPADM

## 2022-12-30 RX ORDER — TAMSULOSIN HYDROCHLORIDE 0.4 MG/1
0.4 CAPSULE ORAL DAILY
Status: DISCONTINUED | OUTPATIENT
Start: 2022-12-30 | End: 2023-01-02 | Stop reason: HOSPADM

## 2022-12-30 RX ORDER — METOPROLOL SUCCINATE 25 MG/1
25 TABLET, EXTENDED RELEASE ORAL DAILY
COMMUNITY
Start: 2022-12-05

## 2022-12-30 RX ORDER — ACETAMINOPHEN 325 MG/1
650 TABLET ORAL EVERY 6 HOURS PRN
Status: DISCONTINUED | OUTPATIENT
Start: 2022-12-30 | End: 2023-01-02 | Stop reason: HOSPADM

## 2022-12-30 RX ORDER — TAMSULOSIN HYDROCHLORIDE 0.4 MG/1
0.4 CAPSULE ORAL DAILY
COMMUNITY
Start: 2022-10-18

## 2022-12-30 RX ORDER — ACETAMINOPHEN 650 MG/1
650 SUPPOSITORY RECTAL EVERY 6 HOURS PRN
Status: DISCONTINUED | OUTPATIENT
Start: 2022-12-30 | End: 2023-01-02 | Stop reason: HOSPADM

## 2022-12-30 RX ORDER — ONDANSETRON 2 MG/ML
4 INJECTION INTRAMUSCULAR; INTRAVENOUS EVERY 6 HOURS PRN
Status: DISCONTINUED | OUTPATIENT
Start: 2022-12-30 | End: 2023-01-02 | Stop reason: HOSPADM

## 2022-12-30 RX ORDER — FENOFIBRATE 48 MG/1
200 TABLET, COATED ORAL DAILY
COMMUNITY

## 2022-12-30 RX ORDER — INSULIN LISPRO 100 [IU]/ML
0-16 INJECTION, SOLUTION INTRAVENOUS; SUBCUTANEOUS
Status: DISCONTINUED | OUTPATIENT
Start: 2022-12-30 | End: 2022-12-31

## 2022-12-30 RX ORDER — ROSUVASTATIN CALCIUM 5 MG/1
5 TABLET, COATED ORAL NIGHTLY
Status: DISCONTINUED | OUTPATIENT
Start: 2022-12-30 | End: 2022-12-31

## 2022-12-30 RX ORDER — SODIUM CHLORIDE 0.9 % (FLUSH) 0.9 %
5-40 SYRINGE (ML) INJECTION PRN
Status: DISCONTINUED | OUTPATIENT
Start: 2022-12-30 | End: 2023-01-02 | Stop reason: HOSPADM

## 2022-12-30 RX ORDER — PANTOPRAZOLE SODIUM 40 MG/10ML
40 INJECTION, POWDER, LYOPHILIZED, FOR SOLUTION INTRAVENOUS 2 TIMES DAILY
Status: DISCONTINUED | OUTPATIENT
Start: 2022-12-30 | End: 2022-12-30 | Stop reason: CLARIF

## 2022-12-30 RX ORDER — INSULIN GLARGINE 100 [IU]/ML
6 INJECTION, SOLUTION SUBCUTANEOUS NIGHTLY
Status: DISCONTINUED | OUTPATIENT
Start: 2022-12-30 | End: 2022-12-31

## 2022-12-30 RX ORDER — ASPIRIN 81 MG/1
81 TABLET, CHEWABLE ORAL DAILY
COMMUNITY

## 2022-12-30 RX ORDER — INSULIN GLARGINE 100 [IU]/ML
INJECTION, SOLUTION SUBCUTANEOUS NIGHTLY
COMMUNITY

## 2022-12-30 RX ORDER — SODIUM CHLORIDE 0.9 % (FLUSH) 0.9 %
5-40 SYRINGE (ML) INJECTION EVERY 12 HOURS SCHEDULED
Status: DISCONTINUED | OUTPATIENT
Start: 2022-12-30 | End: 2023-01-02 | Stop reason: HOSPADM

## 2022-12-30 RX ORDER — DEXTROSE MONOHYDRATE 100 MG/ML
INJECTION, SOLUTION INTRAVENOUS CONTINUOUS PRN
Status: DISCONTINUED | OUTPATIENT
Start: 2022-12-30 | End: 2023-01-02 | Stop reason: HOSPADM

## 2022-12-30 RX ADMIN — INSULIN GLARGINE 6 UNITS: 100 INJECTION, SOLUTION SUBCUTANEOUS at 22:00

## 2022-12-30 RX ADMIN — ROSUVASTATIN CALCIUM 5 MG: 5 TABLET, COATED ORAL at 22:06

## 2022-12-30 RX ADMIN — SODIUM CHLORIDE, PRESERVATIVE FREE 40 MG: 5 INJECTION INTRAVENOUS at 09:04

## 2022-12-30 RX ADMIN — SODIUM CHLORIDE, PRESERVATIVE FREE 10 ML: 5 INJECTION INTRAVENOUS at 22:59

## 2022-12-30 RX ADMIN — TAMSULOSIN HYDROCHLORIDE 0.4 MG: 0.4 CAPSULE ORAL at 09:04

## 2022-12-30 RX ADMIN — SODIUM CHLORIDE, PRESERVATIVE FREE 10 ML: 5 INJECTION INTRAVENOUS at 09:05

## 2022-12-30 RX ADMIN — INSULIN LISPRO 4 UNITS: 100 INJECTION, SOLUTION INTRAVENOUS; SUBCUTANEOUS at 22:00

## 2022-12-30 RX ADMIN — SODIUM CHLORIDE, PRESERVATIVE FREE 40 MG: 5 INJECTION INTRAVENOUS at 22:06

## 2022-12-30 RX ADMIN — INSULIN LISPRO 12 UNITS: 100 INJECTION, SOLUTION INTRAVENOUS; SUBCUTANEOUS at 11:47

## 2022-12-30 RX ADMIN — DEXAMETHASONE SODIUM PHOSPHATE 6 MG: 10 INJECTION, SOLUTION INTRAMUSCULAR; INTRAVENOUS at 09:04

## 2022-12-30 RX ADMIN — METOPROLOL SUCCINATE 25 MG: 25 TABLET, EXTENDED RELEASE ORAL at 09:03

## 2022-12-30 RX ADMIN — INSULIN HUMAN 10 UNITS: 100 INJECTION, SOLUTION PARENTERAL at 09:50

## 2022-12-30 NOTE — PROGRESS NOTES
Contacted Dr Addi Wray regarding possibly getting a diet order for pt. Asked Dr Addi Wray if he foresaw any tests later on today . Dr Addi Wray gave ok for diet order to be placed. Contacted Dr Rachel Emmanuel regarding placing diet order. Received order for regular diet with 5 Carbs and 2g Na+. Order placed.

## 2022-12-30 NOTE — ED NOTES
ED TO INPATIENT SBAR HANDOFF    Patient Name: Miroslava Fountain   :  1955  69 y.o. MRN:  [id number]  Preferred Name  Jenny Dangelo  ED Room #:  ED29/ED-29  Family/Caregiver Present yes   Restraints no   Sitter no   Sepsis Risk Score Sepsis Risk Score: 0.65    Situation  Code Status: No Order No additional code details. Allergies: Pcn [penicillins]  Weight: Patient Vitals for the past 96 hrs (Last 3 readings):   Weight   22 1713 190 lb (86.2 kg)     Arrived from: home  Chief Complaint:   Chief Complaint   Patient presents with    Positive For Covid-19     Since Tuesday    Shortness of [address] Problem/Diagnosis:  Active Problems:    * No active hospital problems. *  Resolved Problems:    * No resolved hospital problems. *    Imaging:   XR CHEST PORTABLE   Final Result   Stable chest x-ray. No acute disease.            Abnormal labs:   Abnormal Labs Reviewed   RESPIRATORY PANEL, MOLECULAR, WITH COVID-19 - Abnormal; Notable for the following components:       Result Value    SARS-CoV-2 DETECTED BY PCR (*)     All other components within normal limits   CBC WITH AUTO DIFFERENTIAL - Abnormal; Notable for the following components:    RBC 2.55 (*)     Hemoglobin 7.9 (*)     Hematocrit 24.8 (*)     MCHC 31.9 (*)     MPV 11.8 (*)     Segs Relative 71.3 (*)     Lymphocytes % 17.7 (*)     Monocytes % 9.2 (*)     Immature Neutrophil % 0.7 (*)     All other components within normal limits   COMPREHENSIVE METABOLIC PANEL - Abnormal; Notable for the following components:    Sodium 134 (*)     BUN 29 (*)     Creatinine 1.4 (*)     Est, Glom Filt Rate 54 (*)     Glucose 167 (*)     Albumin 2.9 (*)     Total Protein 5.9 (*)     Alkaline Phosphatase 35 (*)     All other components within normal limits   BRAIN NATRIURETIC PEPTIDE - Abnormal; Notable for the following components:    Pro-.1 (*)     All other components within normal limits     Critical values: no     Abnormal Assessment Findings:   Pt has slight SOB and is positive for COVID     Background  History:   Past Medical History:   Diagnosis Date    Diabetes mellitus ([facility] [address]        Assessment    Vitals/MEWS: MEWS Score: 1  Level of Consciousness: Alert (0)   Vitals:    12/29/22 2100 12/29/22 2135 12/29/22 2330 12/29/22 2345   BP: (!) 137/57 (!) 140/55  (!) 139/51   Pulse: 74 74 73 83   Resp: 23 21 23 19   Temp:    98.5 °F (36.9 °C)   TempSrc:    Oral   SpO2: 99% 98% 96% 98%   Weight:       Height:         FiO2 (%): nasal cannula for respiratory distress  O2 Flow Rate: O2 Device: Nasal cannula O2 Flow Rate (L/min): 3 L/min  Cardiac Rhythm:    Pain Assessment: 0   [] Verbal [] Donato Keyanna Scale  Pain Scale: Pain Assessment  Pain Assessment: None - Denies Pain  Last documented pain score (0-10 scale)    Last documented pain medication administered: None  Mental Status: oriented, alert, thought processes intact, and able to concentrate and follow conversation  NIH Score:    C-SSRS: Risk of Suicide: No Risk  Bedside swallow:    Kimberly Coma Scale (GCS): Kimberly Coma Scale  Eye Opening: Spontaneous  Best Verbal Response: Oriented  Best Motor Response: Obeys commands  Kimberly Coma Scale Score: 15  Active LDA's:   Peripheral IV 12/29/22 Left Wrist (Active)     PO Status: Regular  Pertinent or High Risk Medications/Drips: no   If Yes, please provide details: NA  Pending Blood Product Administration: no     You may also review the ED PT Care Timeline found under the Summary Nursing Index tab. Recommendation    Pending orders None at this time  Plan for Discharge (if known): Additional Comments: pt comes from home with wife at bedside. Pt ambulates by self with no concerns. Pt does not wear O2 at home but was placed on O2 in ED due to SOB and low SpO2.      If any further questions, please call Sending RN at [phone]    Electronically signed by: Electronically signed by Kang Schwartz RN on 12/29/2022 at 11:48 PM       Kang Schwartz RN  12/29/22 2417

## 2022-12-30 NOTE — ED NOTES
Report received from Counts include 234 beds at the [facility], RN  12/29/22 0069

## 2022-12-30 NOTE — CONSULTS
Department of Internal Medicine  Gastroenterology Consult Note  Suzette Lucero MD      Reason for Consult:  Anemia. Primary Care Physician:  No primary care provider on file. History Obtained From:  patient    HISTORY OF PRESENT ILLNESS:              The patient is a 69 y.o.  male who was admitted with hypoxia and found to have Covid 19. He started becoming ill on 12/25 with diarrhea and lower abdominal cramps. These symptoms persisted up to admission. He tested negative for Covid on 12/26 as well as for flu. He was retested for covid later and became positive. He had very intense lower abdominal cramps. Although he denies melena he said his stools were a dark brown like chocolate. He was placed on eliquis last month for a fib around Thanksgiving. He incidentally was found on admission to be anemic. On 12/29 his hemoglobin was 7.9. This was repeated today with values of 8.0 and 7.8. His lower abdominal cramps are now better. He did have a colonoscopy in 2017. He denies the use of asa, nsaids and has never had an ulcer. Past Medical History:        Diagnosis Date    Diabetes mellitus ([facility] [address]        Past Surgical History:    History reviewed. No pertinent surgical history. Medications Prior to Admission:    Prior to Admission medications    Medication Sig Start Date End Date Taking?  Authorizing Provider   apixaban (ELIQUIS) 5 MG TABS tablet Take 5 mg by mouth in the morning and at bedtime 11/15/22  Yes Historical Provider, MD   metoprolol succinate (TOPROL XL) 25 MG extended release tablet Take 25 mg by mouth daily 12/5/22  Yes Historical Provider, MD   SITagliptin (JANUVIA) 100 MG tablet Take 100 mg by mouth daily 10/30/22  Yes Historical Provider, MD   aspirin 81 MG chewable tablet Take 81 mg by mouth daily    Historical Provider, MD   fenofibrate (TRICOR) 48 MG tablet Take 48 mg by mouth daily    Historical Provider, MD   pioglitazone (ACTOS) 45 MG tablet Take 45 mg by mouth daily 10/30/22 Historical Provider, MD   rosuvastatin (CRESTOR) 10 MG tablet Take 10 mg by mouth daily    Historical Provider, MD   tamsulosin (FLOMAX) 0.4 MG capsule Take 0.4 mg by mouth daily 10/18/22   Historical Provider, MD       Allergies: Allergies   Allergen Reactions    Pcn [Penicillins]    . Social History:    TOBACCO:  No  ETOH:  No    Family History: No family history of gi disease. History reviewed. No pertinent family history. REVIEW OF SYSTEMS: (POSITIVES WILL BE UNDERLINED)  CONSTITUTIONAL:    Weight change,fatigue, fever, chills  EYES:  Diplopia, change in vision  EARS:  hearing loss, tinnitus, vertigo  NOSE:   epistaxis  MOUTH/THROAT:     hoarseness, sore throat. RESPIRATORY:  SOB,  cough, sputum, hemoptysis  CARDIOVASCULAR : chest pain,palpitations, dyspnea exertion, orthopnea, paroxysmal nocturnal dyspnea, pedal edema. GASTROINTESTINAL:  See HPI  GENITOURINARY:   dysuria, hematuria, . HEMATOLOGIC/LYMPHATIC:   Anemia, bleeding tendencies. MUSCULOSKELETAL:    myalgias,  joint pain  NEUROLOGICAL:   Loss of Consciousness, paresthesias, anesthesias, focal weakness  SKIN :   History of dermatitis, rashes  PSYCHIATRIC:  depression, , anxiety past psychosis  ENDOCRINE:  History of diabetes, thyroid disease  ALL/IMM : allergies, rashes    PHYSICAL EXAM:    Vitals:  /65   Pulse 60   Temp 98.7 °F (37.1 °C) (Oral)   Resp 16   Ht 5' 9\" (1.753 m)   Wt 190 lb (86.2 kg)   SpO2 98%   BMI 28.06 kg/m²   CONSTITUTIONAL: alert, cooperative, no apparent distress,   EYES:  pupils equal, round and reactive to light and sclera clear  ENT:  normocepalic, without obvious abnormality  NECK:  supple, symmetrical, trachea midline  HEMATOLOGIC/LYMPHATICS:  no cervical lymphadenopathy and no supraclavicular lymphadenopathy  LUNGS:  clear to auscultation  CARDIOVASCULAR:  regular rate and rhythm and no murmur noted  ABDOMEN:  Soft, non-tender with normal bowel sounds.  No organomegaly or masses  NEUROLOGIC: no focal deficit detected  SKIN:  no lesions  EXTREMITIES: no clubbing, cyanosis, or edema    IMPRESSION:  1) His gi symptoms were probably caused by covid, suspect some colonic irritation with a subacute bleed exacerbated by eliquis. RECOMMENDATIONS:  1) Continue to hold eliquis, continue protonix. Will clinically observe patient. 2) Would prefer to hold off on procedures involving sedation if he remains stable especially in view of recent hypoxemia. 3) Will advance diet to regular and see how he tolerates it.        Electronically signed by Stoney Walker MD on 12/30/2022 at 3:13 PM

## 2022-12-30 NOTE — PROGRESS NOTES
Made aware that pt's bg this morning was 454. Messaged Dr Alexa Rojas. Gave 1 time dose per order of NPH 10 units. Recheck 30mins later was 371. Dr Alexa Rojas made aware.

## 2022-12-30 NOTE — PLAN OF CARE
Informed Consent for Blood Component Transfusion Note     I have discussed with the patient the rationale for blood component transfusion; its benefits in treating or preventing fatigue, organ damage, or death; and its risk which includes mild transfusion reactions, rare risk of blood borne infection, or more serious but rare reactions. I have discussed the alternatives to transfusion, including the risk and consequences of not receiving transfusion. The patient had an opportunity to ask questions and had agreed to proceed with transfusion of blood components.     Shantelle Gallegos MD

## 2022-12-30 NOTE — PLAN OF CARE
Problem: Discharge Planning  Goal: Discharge to home or other facility with appropriate resources  12/30/2022 1016 by Jenn Escobar RN  Outcome: Progressing  12/30/2022 1015 by Jenn Escobar RN  Flowsheets (Taken 12/30/2022 1015)  Discharge to home or other facility with appropriate resources:   Identify barriers to discharge with patient and caregiver   Arrange for needed discharge resources and transportation as appropriate   Identify discharge learning needs (meds, wound care, etc)   Arrange for interpreters to assist at discharge as needed   Refer to discharge planning if patient needs post-hospital services based on physician order or complex needs related to functional status, cognitive ability or social support system     Problem: Gastrointestinal - Adult  Goal: Minimal or absence of nausea and vomiting  12/30/2022 1016 by Jenn Escobar RN  Outcome: Progressing  12/30/2022 1015 by Jenn Escobar RN  Flowsheets (Taken 12/30/2022 1015)  Minimal or absence of nausea and vomiting:   Administer IV fluids as ordered to ensure adequate hydration   Provide nonpharmacologic comfort measures as appropriate   Nutrition consult to assist patient with adequate nutrition and appropriate food choices   Advance diet as tolerated, if ordered   Administer ordered antiemetic medications as needed   Maintain NPO status until nausea and vomiting are resolved  Goal: Maintains or returns to baseline bowel function  12/30/2022 1016 by Jenn Escobar RN  Outcome: Progressing  12/30/2022 1015 by Jenn Escobar RN  Flowsheets (Taken 12/30/2022 1015)  Maintains or returns to baseline bowel function:   Assess bowel function   Administer IV fluids as ordered to ensure adequate hydration   Encourage mobilization and activity   Encourage oral fluids to ensure adequate hydration   Administer ordered medications as needed   Nutrition consult to assist patient with appropriate food choices  Goal: Maintains adequate nutritional intake  12/30/2022 1016 by Turner Marroquin RN  Outcome: Progressing  12/30/2022 1015 by Turner Marroquin RN  Flowsheets (Taken 12/30/2022 1015)  Maintains adequate nutritional intake:   Monitor percentage of each meal consumed   Assist with meals as needed   Obtain nutritional consult as needed   Identify factors contributing to decreased intake, treat as appropriate   Monitor intake and output, weight and lab values  Goal: Establish and maintain optimal ostomy function  12/30/2022 1016 by Turner Marroquin RN  Outcome: Progressing  12/30/2022 1015 by Turner Marroquin RN  Flowsheets (Taken 12/30/2022 1015)  Establish and maintain optimal ostomy function:   Monitor output from ostomies   Nutrition consult   Gastric suctioning as ordered   Administer IV fluids and TPN as ordered   Introduce and advance enteral feedings as ordered   Infuse IV Fluids/TPN as ordered     Problem: Hematologic - Adult  Goal: Maintains hematologic stability  12/30/2022 1016 by Turner Marroquin RN  Outcome: Progressing  12/30/2022 1015 by Turner Marroquin RN  Flowsheets (Taken 12/30/2022 1015)  Maintains hematologic stability:   Assess for signs and symptoms of bleeding or hemorrhage   Administer blood products/factors as ordered   Monitor labs for bleeding or clotting disorders

## 2022-12-30 NOTE — PROGRESS NOTES
4 Eyes Skin Assessment     NAME:  Ilda Miller  YOB: 1955  MEDICAL RECORD NUMBER:  [id number]    The patient is being assessed for  {Reason for Assessment:47902}    I agree that One RN have performed a thorough Head to Toe Skin Assessment on the patient. ALL assessment sites listed below have been assessed. Areas assessed by both nurses:    {Pressure Areas Assessed:64410}        Does the Patient have a Wound? {Action Wound:82459}       [place] Prevention initiated by RN: {YES/NO:19732}   Wound Care Orders initiated by RN: {YES/NO:19732}    Pressure Injury (Stage 3,4, Unstageable, DTI, NWPT, and Complex wounds) if present place referral order by RN under : {YES/NO:19732}    New and Established Ostomies, if present place, referral order under : {YES/NO:19732}      Nurse 1 eSignature: {Esignature:[id number]}    **SHARE this note so that the co-signing nurse is able to place an eSignature**    Nurse 2 eSignature: {Esignature:[id number]}      4 Eyes Skin Assessment     NAME:  Ilda Miller  YOB: 1955  MEDICAL RECORD NUMBER:  [id number]    The patient is being assessed for  {Reason for Assessment:70110}    I agree that One RN have performed a thorough Head to Toe Skin Assessment on the patient. ALL assessment sites listed below have been assessed. Areas assessed by both nurses:    {Pressure Areas Assessed:02061}        Does the Patient have a Wound?  {Action Wound:30638}       Hernan Prevention initiated by RN: {YES/NO:19732}   Wound Care Orders initiated by RN: {YES/NO:19732}    Pressure Injury (Stage 3,4, Unstageable, DTI, NWPT, and Complex wounds) if present place referral order by RN under : {YES/NO:19732}    New and Established Ostomies, if present place, referral order under : {YES/NO:19732}      Nurse 1 eSignature: Electronically signed by Kelsie Campbell RN on 12/30/22 at 5:10 AM EST    **SHARE this note so that the co-signing nurse is able to place an eSignature**    Nurse 2 eSignature: {Esignature:[id number]}

## 2022-12-30 NOTE — CARE COORDINATION
CM spoke with pt to initiate discharge planning. Role of CM explained. Pt has insurance and is able to afford medication. Pt has PCP. Pt is from home with his wife. He drives, has a reliable vehicle and he is independent. Plan home, no needs. CM contact information given to pt. CM team available as needs arise.

## 2022-12-30 NOTE — H&P
History and Physical      Name:  Rosalina Worley /Age/Sex: 1955  (69 y.o. male)   MRN & CSN:  [id number] & [id number] Encounter Date/Time: 2022 1:12 AM EST   Location:  ED29/ED-29 PCP: No primary care provider on file. Hospital Day: 2    Assessment and Plan:     Patient is a 24-year-old male who presented with SOB x5 days. # Acute hypoxemic respiratory failure secondary to COVID pneumonia  - Developed generalized fatigue and loose stools on . No known sick contacts. Received vaccine x3, last in 2021. Tested positive for COVID at home on .  - SpO2 in high 80's on RA, requiring 3 L NC. No leukocytosis. CXR without infiltrates. - Started on Decardron. Consider [place]. O2 prn. Supportive care. Isolation. # Acute anemia  - Denied any source of bleeding, but endorsed dark-brown stools recently. No NSAIDs. On Eliquis for AF since 2022. Had colonoscopy in 2017 (?) which was negative per patient.   - Hg 7.9 frp, 11.6 in 10/2022. Normal MCV and RDW.  - GI consulted, follow-up. - Serial H/H. PPI BID. NPO. Follow-up anemia panel. # Atrial fibrillation  - Hold Eliquis in setting of anemia.   - Continue Toprol-XL 25 mg. # CAD  - Stress test negative in 2022 per patient. # T2DM  - Last A1c 6.1% in 2021, repeat pending.   - Held Januvia and Actos. - Started on Insulin Lantus 6 u qhs with LCSI. # [id number]  - Avoid nephrotoxic medications. # BPH  - Continue Flomax. # HLD  - Continue Crestor. Checklist:  Advanced directive: full  Antibiotics: none  Catheter: none  DVT ppx: held in setting of anemia  Nutrition/Diet: NPO  Sugar: BG goal of 140-180 while inpatient    Disposition: patient requires continued admission due to acute hypoxemic respiratory failure secondary to COVID pneumonia. MDM: moderate.     History of Present Illness:     Chief Complaint: shortness of breath    Patient is a 24-year-old male with a PMHx of HLD, T2DM, AF (on Eliquis), CAD, BPH and CKD3a who presented to the ED with generalized fatigue and loose stools on 12/25. No known sick contacts. Received vaccine x3, last in 8/2021. Recently seen at OSH ED and was negative for Flu. Tested positive for COVID at home on 12/29. Denied any source of bleeding, but endorsed dark-brown stools recently. No NSAIDs. On Eliquis for AF since 11/2022. Had colonoscopy in 2017 (?) which was negative per patient. Denied any fevers, chills, CP, N/V, abdominal pain or urinary changes. ROS:     Ten point ROS reviewed negative, unless as noted above. Objective:     No intake or output data in the 24 hours ending 12/30/22 0112     Vitals:   Vitals:    12/29/22 2100 12/29/22 2135 12/29/22 2330 12/29/22 2345   BP: (!) 137/57 (!) 140/55  (!) 139/51   Pulse: 74 74 73 83   Resp: 23 21 23 19   Temp:    98.5 °F (36.9 °C)   TempSrc:    Oral   SpO2: 99% 98% 96% 98%   Weight:       Height:         BMI: Body mass index is 28.06 kg/m². General: Awake. WDWN. AAOx3. HEENT: PERRLA. Vision grossly intact. Hearing grossly intact. Oropharynx clear. Dry MM. Neck: Supple. No JVD. CV: RRR. NL S1/S2. No murmurs. CR <4 secs. No peripheral edema. Pulm: Increased effort on 3 L NC. CTAB. CW NTTP. GI: +BS x4. Soft. NT/ND. : No CVA or suprapubic tenderness. No Watkins catheter. Skin: Intact. Pale. MSK: No gross joint deformities. Full ROM. Muscle strength is 5/5 B/L. Neuro: CNs grossly intact. Normal speech. No focal deficit. Psych: Good judgement and reason. Past History: PMHx:   Past Medical History:   Diagnosis Date    Diabetes mellitus ([address]        PSHx: History reviewed. No pertinent surgical history. Allergies: Allergies   Allergen Reactions    Pcn [Penicillins]        FHx: family history is not on file.     SHx:   Social History     Socioeconomic History    Marital status:      Spouse name: None    Number of children: None    Years of education: None    Highest education level: None   Tobacco Use    Smoking status: Every Day     Packs/day: 1.50     Types: Cigarettes   Substance and Sexual Activity    Alcohol use: Never    Drug use: Never       Medications Prior to Admission     Prior to Admission medications    Not on File       Data:     CBC:   Recent Labs     12/29/22  1718   WBC 4.4   HGB 7.9*      MCV 97.3   RDW 14.3   LYMPHOPCT 17.7*   MONOPCT 9.2*   BASOPCT 0.2   MONOSABS 0.4   LYMPHSABS 0.8   EOSABS 0.0   BASOSABS 0.0     CMP:    Recent Labs     12/29/22  1718   *   K 4.5      CO2 23   BUN 29*   CREATININE 1.4*   GLUCOSE 167*   LABALBU 2.9*   CALCIUM 8.6   BILITOT 0.4   ALKPHOS 35*   AST 26   ALT 17     Lipids: No results found for: CHOL, HDL, TRIG  Hemoglobin A1C: No results found for: LABA1C  TSH: No results found for: TSH  Troponin:   Lab Results   Component Value Date/Time    TROPONINT <0.010 12/29/2022 05:18 PM    TROPONINT <0.010 10/15/2022 07:03 PM     BNP:   Recent Labs     12/29/22  1718   PROBNP 983.1*     Lactic Acid: No results for input(s): LACTA in the last 72 hours. UA:No results found for: [place], PHUR, LABCAST, WBCUA, RBCUA, MUCUS, TRICHOMONAS, YEAST, BACTERIA, CLARITYU, SPECGRAV, LEUKOCYTESUR, UROBILINOGEN, BILIRUBINUR, BLOODU, GLUCOSEU, KETUA, AMORPHOUS  Urine Cultures: No results found for: LABURIN  Blood Cultures: No results found for: BC  No results found for: BLOODCULT2  Organism: No results found for: [facility]    Radiology results:  XR CHEST PORTABLE   Final Result   Stable chest x-ray. No acute disease.              Medications:     Medications:        Infusions:    sodium chloride 100 mL/hr at 12/29/22 2018       PRN Meds:        Kirt Vasquez MD  12/30/22 1:12 AM

## 2022-12-30 NOTE — ED NOTES
Patient up to use restroom. Urine sample provided at this time.      Guanako Lambert RN  12/30/22 0116

## 2022-12-31 LAB
BASOPHILS ABSOLUTE: 0 K/CU MM
BASOPHILS RELATIVE PERCENT: 0 % (ref 0–1)
DIFFERENTIAL TYPE: ABNORMAL
EKG ATRIAL RATE: 70 BPM
EKG DIAGNOSIS: NORMAL
EKG P AXIS: 72 DEGREES
EKG P-R INTERVAL: 132 MS
EKG Q-T INTERVAL: 374 MS
EKG QRS DURATION: 76 MS
EKG QTC CALCULATION (BAZETT): 403 MS
EKG R AXIS: 84 DEGREES
EKG T AXIS: 52 DEGREES
EKG VENTRICULAR RATE: 70 BPM
EOSINOPHILS ABSOLUTE: 0 K/CU MM
EOSINOPHILS RELATIVE PERCENT: 0 % (ref 0–3)
GLUCOSE BLD-MCNC: 167 MG/DL (ref 70–99)
GLUCOSE BLD-MCNC: 262 MG/DL (ref 70–99)
GLUCOSE BLD-MCNC: 387 MG/DL (ref 70–99)
HCT VFR BLD CALC: 24.2 % (ref 42–52)
HEMOGLOBIN: 7.7 GM/DL (ref 13.5–18)
IMMATURE NEUTROPHIL %: 0.5 % (ref 0–0.43)
LYMPHOCYTES ABSOLUTE: 0.8 K/CU MM
LYMPHOCYTES RELATIVE PERCENT: 9.7 % (ref 24–44)
MCH RBC QN AUTO: 30.6 PG (ref 27–31)
MCHC RBC AUTO-ENTMCNC: 31.8 % (ref 32–36)
MCV RBC AUTO: 96 FL (ref 78–100)
MONOCYTES ABSOLUTE: 0.5 K/CU MM
MONOCYTES RELATIVE PERCENT: 5.6 % (ref 0–4)
NUCLEATED RBC %: 0 %
PDW BLD-RTO: 14.4 % (ref 11.7–14.9)
PLATELET # BLD: 198 K/CU MM (ref 140–440)
PMV BLD AUTO: 11.6 FL (ref 7.5–11.1)
RBC # BLD: 2.52 M/CU MM (ref 4.6–6.2)
SEGMENTED NEUTROPHILS ABSOLUTE COUNT: 6.8 K/CU MM
SEGMENTED NEUTROPHILS RELATIVE PERCENT: 84.2 % (ref 36–66)
TOTAL IMMATURE NEUTOROPHIL: 0.04 K/CU MM
TOTAL NUCLEATED RBC: 0 K/CU MM
WBC # BLD: 8 K/CU MM (ref 4–10.5)

## 2022-12-31 PROCEDURE — A4216 STERILE WATER/SALINE, 10 ML: HCPCS | Performed by: STUDENT IN AN ORGANIZED HEALTH CARE EDUCATION/TRAINING PROGRAM

## 2022-12-31 PROCEDURE — C9113 INJ PANTOPRAZOLE SODIUM, VIA: HCPCS | Performed by: STUDENT IN AN ORGANIZED HEALTH CARE EDUCATION/TRAINING PROGRAM

## 2022-12-31 PROCEDURE — 6370000000 HC RX 637 (ALT 250 FOR IP): Performed by: STUDENT IN AN ORGANIZED HEALTH CARE EDUCATION/TRAINING PROGRAM

## 2022-12-31 PROCEDURE — 85025 COMPLETE CBC W/AUTO DIFF WBC: CPT

## 2022-12-31 PROCEDURE — 1200000000 HC SEMI PRIVATE

## 2022-12-31 PROCEDURE — 82962 GLUCOSE BLOOD TEST: CPT

## 2022-12-31 PROCEDURE — 6360000002 HC RX W HCPCS: Performed by: STUDENT IN AN ORGANIZED HEALTH CARE EDUCATION/TRAINING PROGRAM

## 2022-12-31 PROCEDURE — 2580000003 HC RX 258: Performed by: STUDENT IN AN ORGANIZED HEALTH CARE EDUCATION/TRAINING PROGRAM

## 2022-12-31 PROCEDURE — 36415 COLL VENOUS BLD VENIPUNCTURE: CPT

## 2022-12-31 PROCEDURE — 93010 ELECTROCARDIOGRAM REPORT: CPT | Performed by: INTERNAL MEDICINE

## 2022-12-31 PROCEDURE — 94761 N-INVAS EAR/PLS OXIMETRY MLT: CPT

## 2022-12-31 RX ORDER — ROSUVASTATIN CALCIUM 5 MG/1
10 TABLET, COATED ORAL NIGHTLY
Status: DISCONTINUED | OUTPATIENT
Start: 2022-12-31 | End: 2023-01-02 | Stop reason: HOSPADM

## 2022-12-31 RX ORDER — PIOGLITAZONEHYDROCHLORIDE 45 MG/1
45 TABLET ORAL DAILY
Status: DISCONTINUED | OUTPATIENT
Start: 2022-12-31 | End: 2023-01-02 | Stop reason: HOSPADM

## 2022-12-31 RX ORDER — PIOGLITAZONEHYDROCHLORIDE 45 MG/1
45 TABLET ORAL DAILY
Status: DISCONTINUED | OUTPATIENT
Start: 2022-12-31 | End: 2022-12-31

## 2022-12-31 RX ORDER — INSULIN GLARGINE 100 [IU]/ML
45 INJECTION, SOLUTION SUBCUTANEOUS NIGHTLY
Status: DISCONTINUED | OUTPATIENT
Start: 2022-12-31 | End: 2023-01-02 | Stop reason: HOSPADM

## 2022-12-31 RX ADMIN — SODIUM CHLORIDE, PRESERVATIVE FREE 10 ML: 5 INJECTION INTRAVENOUS at 20:36

## 2022-12-31 RX ADMIN — SODIUM CHLORIDE, PRESERVATIVE FREE 40 MG: 5 INJECTION INTRAVENOUS at 20:37

## 2022-12-31 RX ADMIN — INSULIN GLARGINE 45 UNITS: 100 INJECTION, SOLUTION SUBCUTANEOUS at 20:38

## 2022-12-31 RX ADMIN — SODIUM CHLORIDE, PRESERVATIVE FREE 40 MG: 5 INJECTION INTRAVENOUS at 08:56

## 2022-12-31 RX ADMIN — TAMSULOSIN HYDROCHLORIDE 0.4 MG: 0.4 CAPSULE ORAL at 08:55

## 2022-12-31 RX ADMIN — SODIUM CHLORIDE, PRESERVATIVE FREE 10 ML: 5 INJECTION INTRAVENOUS at 09:06

## 2022-12-31 RX ADMIN — PIOGLITAZONEHYDROCHLORIDE 45 MG: 45 TABLET ORAL at 16:45

## 2022-12-31 RX ADMIN — METOPROLOL SUCCINATE 25 MG: 25 TABLET, EXTENDED RELEASE ORAL at 08:55

## 2022-12-31 RX ADMIN — DEXAMETHASONE SODIUM PHOSPHATE 6 MG: 10 INJECTION, SOLUTION INTRAMUSCULAR; INTRAVENOUS at 08:56

## 2022-12-31 RX ADMIN — ROSUVASTATIN CALCIUM 10 MG: 5 TABLET, FILM COATED ORAL at 20:37

## 2022-12-31 ASSESSMENT — PAIN SCALES - GENERAL: PAINLEVEL_OUTOF10: 0

## 2022-12-31 NOTE — PLAN OF CARE
Problem: Discharge Planning  Goal: Discharge to home or other facility with appropriate resources  Outcome: Progressing  Flowsheets (Taken 12/30/2022 2212 by Shawn Mcallister RN)  Discharge to home or other facility with appropriate resources:   Identify barriers to discharge with patient and caregiver   Arrange for needed discharge resources and transportation as appropriate   Identify discharge learning needs (meds, wound care, etc)   Arrange for interpreters to assist at discharge as needed   Refer to discharge planning if patient needs post-hospital services based on physician order or complex needs related to functional status, cognitive ability or social support system     Problem: Gastrointestinal - Adult  Goal: Minimal or absence of nausea and vomiting  Outcome: Progressing  Goal: Maintains or returns to baseline bowel function  Outcome: Progressing  Goal: Maintains adequate nutritional intake  Outcome: Progressing  Goal: Establish and maintain optimal ostomy function  Outcome: Progressing     Problem: Gastrointestinal - Adult  Goal: Maintains or returns to baseline bowel function  Outcome: Progressing     Problem: Hematologic - Adult  Goal: Maintains hematologic stability  Outcome: Progressing  Flowsheets (Taken 12/30/2022 2212 by Shawn Mcallister RN)  Maintains hematologic stability:   Assess for signs and symptoms of bleeding or hemorrhage   Monitor labs for bleeding or clotting disorders   Administer blood products/factors as ordered

## 2022-12-31 NOTE — PROGRESS NOTES
V2.0  [facility] Hospitalist Progress Note      Name:  Vale Schilder /Age/Sex: 1955  (69 y.o. male)   MRN & CSN:  [id number] & [id number] Encounter Date/Time: 2022 7:45 AM EST    Location:  [address] [place] [address] PCP: Sammy Baird Day: 3    Assessment and Plan:   Vale Schilder is a 69 y.o. male with pmh of HLD, T2DM, AF (on Eliquis), CAD, BPH and CKD3a  who presents with COVID-19 virus infection      Plan:  # Acute hypoxemic respiratory failure secondary to COVID pneumonia  - Tested positive for COVID at home on .  - SpO2 in high 80's on RA, requiring 3 L NC temporarily for a few hours with questionable hypoxia. Now on RA  - No leukocytosis. CXR without infiltrates. - Started on Decardron. Discontinued as patient was not symptomatic (hypoxia is very questionable as he was able to come off in a few hours)  - Pharmacy consulted, not recommended for Baricitinib for possible hematologic toxicity (patient at risk due to Hb <8 and ANC <500). - O2 prn. Supportive care. Droplet Isolation. # Acute anemia  - Denied any source of bleeding, but endorsed dark-brown stools recently. No NSAIDs. On Eliquis for AF since 2022. Had colonoscopy in 2017 which was negative per patient.   - Hg 7.9 frp, 11.6 in 10/2022. Normal MCV and RDW.  - GI consulted, follow-up. - Serial H/H. PPI BID. NPO.   - GI consulted, conservative management at present  - Patient started on diet, tolerating well. - d/w GI, continue to hold off eliquis  -No BM since admission, will start bowel regimen     # Atrial fibrillation  - Hold Eliquis in setting of anemia/suspected GIB.   - Continue Toprol-XL 25 mg.  - GI following for recs about restarting eliquis     # CAD  - Stress test negative in 2022 per patient. # T2DM  - Last A1c 6.1% in 2021, repeat pending.   - restart Januvia and Actos. - resume home dose of lantus 45u HS  - monitor blood sugar     # CKD3a  - Avoid nephrotoxic medications.      # BPH  - Continue Flomax. # HLD  - Continue Crestor. Diet ADULT DIET; Regular; 5 carb choices (75 gm/meal); Low Sodium (2 gm)   DVT Prophylaxis [] Lovenox, []  Heparin, [] SCDs, [] Ambulation,  [] Eliquis, [] Xarelto  [] Coumadin   Code Status Full Code   Disposition From: Home  Expected Disposition: Home  Estimated Date of Discharge: 2-3 days  Patient requires continued admission due to suspected GIB management   Surrogate Decision Maker/ POA      Subjective:     Chief Complaint: Positive For Covid-19 (Since Tuesday) and Shortness of Breath (2L NC)     Seen and evaluated at bedside. Symptomatically improved. Denying any active complaints. Tolerating diet. No BM since admission. Review of Systems:    Review of Systems   Constitutional:  Negative for activity change, appetite change, fatigue and fever. HENT:  Negative for congestion and sore throat. Eyes:  Negative for discharge and visual disturbance. Respiratory:  Negative for cough, shortness of breath and wheezing. Cardiovascular:  Negative for chest pain, palpitations and leg swelling. Gastrointestinal:  Negative for abdominal distention, constipation, diarrhea and nausea. Genitourinary:  Negative for difficulty urinating and hematuria. Musculoskeletal:  Negative for arthralgias, back pain and myalgias. Neurological:  Negative for dizziness, syncope and headaches. Hematological:  Negative for adenopathy. Psychiatric/Behavioral:  Negative for agitation and confusion. Objective: Intake/Output Summary (Last 24 hours) at 12/31/2022 0745  Last data filed at 12/31/2022 0112  Gross per 24 hour   Intake 10 ml   Output 350 ml   Net -340 ml        Vitals:   Vitals:    12/31/22 0300   BP: 133/66   Pulse: 59   Resp: 26   Temp: 98 °F (36.7 °C)   SpO2: 93%       Physical Exam:   Physical Exam  Vitals and nursing note reviewed. Constitutional:       General: He is not in acute distress. Appearance: He is obese. He is not ill-appearing. HENT:      Head: Normocephalic and atraumatic. Mouth/Throat:      Mouth: Mucous membranes are moist.   Eyes:      Extraocular Movements: Extraocular movements intact. Pupils: Pupils are equal, round, and reactive to light. Cardiovascular:      Rate and Rhythm: Normal rate and regular rhythm. Pulses: Normal pulses. Heart sounds: Normal heart sounds. Pulmonary:      Effort: Pulmonary effort is normal.      Breath sounds: Normal breath sounds. Abdominal:      Palpations: Abdomen is soft. Musculoskeletal:         General: No tenderness or signs of injury. Normal range of motion. Skin:     General: Skin is warm. Capillary Refill: Capillary refill takes less than 2 seconds. Neurological:      General: No focal deficit present. Mental Status: He is alert and oriented to person, place, and time. Psychiatric:         Mood and Affect: Mood normal.         Behavior: Behavior normal.         Thought Content:  Thought content normal.         Judgment: Judgment normal.          Medications:   Medications:    pantoprazole (PROTONIX) 40 mg injection  40 mg IntraVENous BID    dexamethasone  6 mg IntraVENous Q24H    insulin glargine  6 Units SubCUTAneous Nightly    sodium chloride flush  5-40 mL IntraVENous 2 times per day    rosuvastatin  5 mg Oral Nightly    tamsulosin  0.4 mg Oral Daily    metoprolol succinate  25 mg Oral Daily    insulin lispro  0-16 Units SubCUTAneous TID WC    insulin lispro  0-4 Units SubCUTAneous Nightly      Infusions:    dextrose      sodium chloride       PRN Meds: glucose, 4 tablet, PRN  dextrose bolus, 125 mL, PRN   Or  dextrose bolus, 250 mL, PRN  glucagon (rDNA), 1 mg, PRN  dextrose, , Continuous PRN  sodium chloride flush, 5-40 mL, PRN  sodium chloride, , PRN  ondansetron, 4 mg, Q8H PRN   Or  ondansetron, 4 mg, Q6H PRN  acetaminophen, 650 mg, Q6H PRN   Or  acetaminophen, 650 mg, Q6H PRN        Labs      Recent Results (from the past 24 hour(s))   POCT Glucose    Collection Time: 12/30/22 10:32 AM   Result Value Ref Range    POC Glucose 371 (H) 70 - 99 MG/DL   POCT Glucose    Collection Time: 12/30/22 11:37 AM   Result Value Ref Range    POC Glucose 350 (H) 70 - 99 MG/DL   CBC with Auto Differential    Collection Time: 12/30/22  1:29 PM   Result Value Ref Range    WBC 5.4 4.0 - 10.5 K/CU MM    RBC 2.50 (L) 4.6 - 6.2 M/CU MM    Hemoglobin 7.8 (L) 13.5 - 18.0 GM/DL    Hematocrit 24.2 (L) 42 - 52 %    MCV 96.8 78 - 100 FL    MCH 31.2 (H) 27 - 31 PG    MCHC 32.2 32.0 - 36.0 %    RDW 14.5 11.7 - 14.9 %    Platelets 437 675 - 177 K/CU MM    MPV 11.2 (H) 7.5 - 11.1 FL    Differential Type AUTOMATED DIFFERENTIAL     Segs Relative 88.5 (H) 36 - 66 %    Lymphocytes % 7.8 (L) 24 - 44 %    Monocytes % 3.3 0 - 4 %    Eosinophils % 0.0 0 - 3 %    Basophils % 0.0 0 - 1 %    Segs Absolute 4.8 K/CU MM    Lymphocytes Absolute 0.4 K/CU MM    Monocytes Absolute 0.2 K/CU MM    Eosinophils Absolute 0.0 K/CU MM    Basophils Absolute 0.0 K/CU MM    Nucleated RBC % 0.0 %    Total Nucleated RBC 0.0 K/CU MM    Total Immature Neutrophil 0.02 K/CU MM    Immature Neutrophil % 0.4 0 - 0.43 %   POCT Glucose    Collection Time: 12/30/22  4:12 PM   Result Value Ref Range    POC Glucose 107 (H) 70 - 99 MG/DL   POCT Glucose    Collection Time: 12/30/22  9:52 PM   Result Value Ref Range    POC Glucose 329 (H) 70 - 99 MG/DL   CBC with Auto Differential    Collection Time: 12/31/22 12:34 AM   Result Value Ref Range    WBC 8.0 4.0 - 10.5 K/CU MM    RBC 2.52 (L) 4.6 - 6.2 M/CU MM    Hemoglobin 7.7 (L) 13.5 - 18.0 GM/DL    Hematocrit 24.2 (L) 42 - 52 %    MCV 96.0 78 - 100 FL    MCH 30.6 27 - 31 PG    MCHC 31.8 (L) 32.0 - 36.0 %    RDW 14.4 11.7 - 14.9 %    Platelets 940 008 - 454 K/CU MM    MPV 11.6 (H) 7.5 - 11.1 FL    Differential Type AUTOMATED DIFFERENTIAL     Segs Relative 84.2 (H) 36 - 66 %    Lymphocytes % 9.7 (L) 24 - 44 %    Monocytes % 5.6 (H) 0 - 4 %    Eosinophils % 0.0 0 - 3 %    Basophils % 0.0 0 - 1 %    Segs Absolute 6.8 K/CU MM    Lymphocytes Absolute 0.8 K/CU MM    Monocytes Absolute 0.5 K/CU MM    Eosinophils Absolute 0.0 K/CU MM    Basophils Absolute 0.0 K/CU MM    Nucleated RBC % 0.0 %    Total Nucleated RBC 0.0 K/CU MM    Total Immature Neutrophil 0.04 K/CU MM    Immature Neutrophil % 0.5 (H) 0 - 0.43 %   POCT Glucose    Collection Time: 12/31/22  6:46 AM   Result Value Ref Range    POC Glucose 167 (H) 70 - 99 MG/DL        Imaging/Diagnostics Last 24 Hours   XR CHEST PORTABLE    Result Date: 12/29/2022  EXAMINATION: ONE XRAY VIEW OF THE CHEST 12/29/2022 6:49 pm COMPARISON: 10/15/2022 HISTORY: ORDERING SYSTEM PROVIDED HISTORY: shortness of breath TECHNOLOGIST PROVIDED HISTORY: Reason for exam:->shortness of breath Reason for Exam: shortness of breath FINDINGS: Heart size and configuration are normal.  Hilar and mediastinal structures are unremarkable. There are chronically increased interstitial lung markings. No acute airspace disease, pneumothorax or pleural effusion. Stable mild biapical pleural thickening. No acute bone finding. Stable chest x-ray. No acute disease.        Electronically signed by Shaka Booker MD on 12/31/2022 at 7:45 AM

## 2023-01-01 LAB
BASOPHILS ABSOLUTE: 0 K/CU MM
BASOPHILS RELATIVE PERCENT: 0.2 % (ref 0–1)
DIFFERENTIAL TYPE: ABNORMAL
EOSINOPHILS ABSOLUTE: 0 K/CU MM
EOSINOPHILS RELATIVE PERCENT: 0.2 % (ref 0–3)
GLUCOSE BLD-MCNC: 142 MG/DL (ref 70–99)
GLUCOSE BLD-MCNC: 160 MG/DL (ref 70–99)
GLUCOSE BLD-MCNC: 163 MG/DL (ref 70–99)
GLUCOSE BLD-MCNC: 175 MG/DL (ref 70–99)
GLUCOSE BLD-MCNC: 178 MG/DL (ref 70–99)
GLUCOSE BLD-MCNC: 271 MG/DL (ref 70–99)
HCT VFR BLD CALC: 25.9 % (ref 42–52)
HEMOGLOBIN: 8.5 GM/DL (ref 13.5–18)
IMMATURE NEUTROPHIL %: 2 % (ref 0–0.43)
LYMPHOCYTES ABSOLUTE: 0.9 K/CU MM
LYMPHOCYTES RELATIVE PERCENT: 17.1 % (ref 24–44)
MCH RBC QN AUTO: 31.3 PG (ref 27–31)
MCHC RBC AUTO-ENTMCNC: 32.8 % (ref 32–36)
MCV RBC AUTO: 95.2 FL (ref 78–100)
MONOCYTES ABSOLUTE: 0.3 K/CU MM
MONOCYTES RELATIVE PERCENT: 6 % (ref 0–4)
NUCLEATED RBC %: 1.3 %
PDW BLD-RTO: 14.3 % (ref 11.7–14.9)
PLATELET # BLD: 264 K/CU MM (ref 140–440)
PMV BLD AUTO: 11.2 FL (ref 7.5–11.1)
RBC # BLD: 2.72 M/CU MM (ref 4.6–6.2)
SEGMENTED NEUTROPHILS ABSOLUTE COUNT: 4 K/CU MM
SEGMENTED NEUTROPHILS RELATIVE PERCENT: 74.5 % (ref 36–66)
TOTAL IMMATURE NEUTOROPHIL: 0.11 K/CU MM
TOTAL NUCLEATED RBC: 0.1 K/CU MM
WBC # BLD: 5.4 K/CU MM (ref 4–10.5)

## 2023-01-01 PROCEDURE — 82962 GLUCOSE BLOOD TEST: CPT

## 2023-01-01 PROCEDURE — 6370000000 HC RX 637 (ALT 250 FOR IP): Performed by: STUDENT IN AN ORGANIZED HEALTH CARE EDUCATION/TRAINING PROGRAM

## 2023-01-01 PROCEDURE — 6360000002 HC RX W HCPCS: Performed by: STUDENT IN AN ORGANIZED HEALTH CARE EDUCATION/TRAINING PROGRAM

## 2023-01-01 PROCEDURE — A4216 STERILE WATER/SALINE, 10 ML: HCPCS | Performed by: STUDENT IN AN ORGANIZED HEALTH CARE EDUCATION/TRAINING PROGRAM

## 2023-01-01 PROCEDURE — 85025 COMPLETE CBC W/AUTO DIFF WBC: CPT

## 2023-01-01 PROCEDURE — 6370000000 HC RX 637 (ALT 250 FOR IP): Performed by: FAMILY MEDICINE

## 2023-01-01 PROCEDURE — 94761 N-INVAS EAR/PLS OXIMETRY MLT: CPT

## 2023-01-01 PROCEDURE — 2580000003 HC RX 258: Performed by: STUDENT IN AN ORGANIZED HEALTH CARE EDUCATION/TRAINING PROGRAM

## 2023-01-01 PROCEDURE — 36415 COLL VENOUS BLD VENIPUNCTURE: CPT

## 2023-01-01 PROCEDURE — C9113 INJ PANTOPRAZOLE SODIUM, VIA: HCPCS | Performed by: STUDENT IN AN ORGANIZED HEALTH CARE EDUCATION/TRAINING PROGRAM

## 2023-01-01 PROCEDURE — 1200000000 HC SEMI PRIVATE

## 2023-01-01 RX ORDER — POLYETHYLENE GLYCOL 3350 17 G/17G
17 POWDER, FOR SOLUTION ORAL DAILY
Status: DISCONTINUED | OUTPATIENT
Start: 2023-01-01 | End: 2023-01-02 | Stop reason: HOSPADM

## 2023-01-01 RX ORDER — INSULIN GLARGINE 100 [IU]/ML
20 INJECTION, SOLUTION SUBCUTANEOUS ONCE
Status: COMPLETED | OUTPATIENT
Start: 2023-01-01 | End: 2023-01-01

## 2023-01-01 RX ADMIN — SODIUM CHLORIDE, PRESERVATIVE FREE 10 ML: 5 INJECTION INTRAVENOUS at 20:45

## 2023-01-01 RX ADMIN — POLYETHYLENE GLYCOL (3350) 17 G: 17 POWDER, FOR SOLUTION ORAL at 10:04

## 2023-01-01 RX ADMIN — METOPROLOL SUCCINATE 25 MG: 25 TABLET, EXTENDED RELEASE ORAL at 10:08

## 2023-01-01 RX ADMIN — TAMSULOSIN HYDROCHLORIDE 0.4 MG: 0.4 CAPSULE ORAL at 10:08

## 2023-01-01 RX ADMIN — PIOGLITAZONEHYDROCHLORIDE 45 MG: 45 TABLET ORAL at 10:09

## 2023-01-01 RX ADMIN — INSULIN GLARGINE 20 UNITS: 100 INJECTION, SOLUTION SUBCUTANEOUS at 22:18

## 2023-01-01 RX ADMIN — ACETAMINOPHEN 650 MG: 325 TABLET ORAL at 16:28

## 2023-01-01 RX ADMIN — SODIUM CHLORIDE, PRESERVATIVE FREE 40 MG: 5 INJECTION INTRAVENOUS at 20:30

## 2023-01-01 RX ADMIN — SODIUM CHLORIDE, PRESERVATIVE FREE 10 ML: 5 INJECTION INTRAVENOUS at 10:09

## 2023-01-01 RX ADMIN — SODIUM CHLORIDE, PRESERVATIVE FREE 40 MG: 5 INJECTION INTRAVENOUS at 10:08

## 2023-01-01 RX ADMIN — ROSUVASTATIN CALCIUM 10 MG: 5 TABLET, FILM COATED ORAL at 20:30

## 2023-01-01 ASSESSMENT — ENCOUNTER SYMPTOMS
BACK PAIN: 0
WHEEZING: 0
ABDOMINAL DISTENTION: 0
SORE THROAT: 0
DIARRHEA: 0
EYE DISCHARGE: 0
CONSTIPATION: 0
COUGH: 0
NAUSEA: 0
SHORTNESS OF BREATH: 0

## 2023-01-01 ASSESSMENT — PAIN SCALES - GENERAL
PAINLEVEL_OUTOF10: 0

## 2023-01-01 NOTE — PLAN OF CARE
Problem: Discharge Planning  Goal: Discharge to home or other facility with appropriate resources  Outcome: Progressing  Flowsheets (Taken 1/1/2023 0800)  Discharge to home or other facility with appropriate resources:   Identify barriers to discharge with patient and caregiver   Arrange for needed discharge resources and transportation as appropriate   Identify discharge learning needs (meds, wound care, etc)   Arrange for interpreters to assist at discharge as needed   Refer to discharge planning if patient needs post-hospital services based on physician order or complex needs related to functional status, cognitive ability or social support system     Problem: Gastrointestinal - Adult  Goal: Minimal or absence of nausea and vomiting  Outcome: Progressing  Flowsheets (Taken 1/1/2023 0800)  Minimal or absence of nausea and vomiting:   Administer IV fluids as ordered to ensure adequate hydration   Maintain NPO status until nausea and vomiting are resolved   Nasogastric tube to low intermittent suction as ordered   Administer ordered antiemetic medications as needed   Provide nonpharmacologic comfort measures as appropriate   Advance diet as tolerated, if ordered   Nutrition consult to assist patient with adequate nutrition and appropriate food choices  Goal: Maintains or returns to baseline bowel function  Outcome: Progressing  Flowsheets (Taken 1/1/2023 0800)  Maintains or returns to baseline bowel function:   Assess bowel function   Encourage oral fluids to ensure adequate hydration   Administer IV fluids as ordered to ensure adequate hydration   Administer ordered medications as needed   Encourage mobilization and activity   Nutrition consult to assist patient with appropriate food choices  Goal: Maintains adequate nutritional intake  Outcome: Progressing  Flowsheets (Taken 1/1/2023 0800)  Maintains adequate nutritional intake:   Monitor percentage of each meal consumed   Identify factors contributing to decreased intake, treat as appropriate   Assist with meals as needed   Monitor intake and output, weight and lab values   Obtain nutritional consult as needed  Goal: Establish and maintain optimal ostomy function  Outcome: Progressing  Flowsheets (Taken 1/1/2023 0800)  Establish and maintain optimal ostomy function:   Monitor output from ostomies   Administer IV fluids and TPN as ordered   Introduce and advance enteral feedings as ordered   Nutrition consult   Gastric suctioning as ordered   Infuse IV Fluids/TPN as ordered     Problem: Hematologic - Adult  Goal: Maintains hematologic stability  Outcome: Progressing  Flowsheets (Taken 1/1/2023 0800)  Maintains hematologic stability:   Assess for signs and symptoms of bleeding or hemorrhage   Monitor labs for bleeding or clotting disorders   Administer blood products/factors as ordered     Problem: Pain  Goal: Verbalizes/displays adequate comfort level or baseline comfort level  Outcome: Progressing

## 2023-01-01 NOTE — PROGRESS NOTES
V2.0  [facility] Hospitalist Progress Note      Name:  Chica Briggs /Age/Sex: 1955  (69 y.o. male)   MRN & CSN:  [id number] & [id number] Encounter Date/Time: 2023 7:45 AM EST    Location:  [facility] PCP: Philip House Day: 4    Assessment and Plan:   Chica Briggs is a 69 y.o. male with pmh of HLD, T2DM, AF (on Eliquis), CAD, BPH and CKD3a  who presents with COVID-19 virus infection      Plan:  # Acute hypoxemic respiratory failure secondary to COVID pneumonia  - Tested positive for COVID at home on .  - SpO2 in high low 90's on RA, requiring 3 L NC temporarily for a few hours with questionable hypoxia. Now on RA  - No leukocytosis. CXR without infiltrates. - Started on Decardron. Discontinued as patient was not symptomatic (hypoxia is very questionable as he was able to come off in a few hours)  - Pharmacy consulted, not recommended for Baricitinib for possible hematologic toxicity (patient at risk due to Hb <8 and ANC <500). - O2 prn. Supportive care. Droplet Isolation. # Acute anemia, rule out GIB  - Denied any source of bleeding, but endorsed dark-brown stools recently. No NSAIDs. On Eliquis for AF since 2022. Had colonoscopy in 2017 which was negative per patient.   - Hg 7.9 frp, 11.6 in 10/2022. Normal MCV and RDW.  - GI consulted, follow-up. - Serial H/H. PPI BID. NPO.   - GI consulted, conservative management at present  - Patient started on diet, tolerating well. - d/w GI, continue to hold off eliquis, needs clearance by GI to restart Eliquis  - had small BM yesterday, reportedly dark colored but no blood/clots noted  - occult stool ordered, will f/up when sent     # Atrial fibrillation  - Hold Eliquis in setting of anemia/suspected GIB.   - Continue Toprol-XL 25 mg.  - GI following for recs about restarting eliquis     # CAD  - Stress test negative in 2022 per patient.      # T2DM  - Last A1c 6.1% in 2021, repeat 6.5 2022  -Restarted on home dose of Januvia and Actos. - resume home dose of lantus 45u HS  - monitor blood sugar     # CKD3a  - Avoid nephrotoxic medications. # BPH  - Continue Flomax. # HLD  - Continue Crestor. Diet ADULT DIET; Regular; 5 carb choices (75 gm/meal); Low Sodium (2 gm)   DVT Prophylaxis [] Lovenox, []  Heparin, [] SCDs, [] Ambulation,  [] Eliquis, [] Xarelto  [] Coumadin   Code Status Full Code   Disposition From: Home  Expected Disposition: Home  Estimated Date of Discharge: 2-3 days  Patient requires continued admission due to suspected GIB management   Surrogate Decision Maker/ POA      Subjective:     Chief Complaint: Positive For Covid-19 (Since Tuesday) and Shortness of Breath (2L NC)     Seen and evaluated at bedside. Denying any active complaints. Tolerating diet. Small BM yesterday, reportedly dark but patient denies seeing any blood/clots. .      Review of Systems:    Review of Systems   Constitutional:  Negative for activity change, appetite change, fatigue and fever. HENT:  Negative for congestion and sore throat. Eyes:  Negative for discharge and visual disturbance. Respiratory:  Negative for cough, shortness of breath and wheezing. Cardiovascular:  Negative for chest pain, palpitations and leg swelling. Gastrointestinal:  Negative for abdominal distention, constipation, diarrhea and nausea. Genitourinary:  Negative for difficulty urinating and hematuria. Musculoskeletal:  Negative for arthralgias, back pain and myalgias. Neurological:  Negative for dizziness, syncope and headaches. Hematological:  Negative for adenopathy. Psychiatric/Behavioral:  Negative for agitation and confusion. Objective:      Intake/Output Summary (Last 24 hours) at 1/1/2023 6751  Last data filed at 12/31/2022 2033  Gross per 24 hour   Intake 120 ml   Output --   Net 120 ml          Vitals:   Vitals:    01/01/23 0319   BP: (!) 143/59   Pulse: 62   Resp: 20   Temp: 97.5 °F (36.4 °C)   SpO2: 92% Physical Exam:   Physical Exam  Vitals and nursing note reviewed. Constitutional:       General: He is not in acute distress. Appearance: He is obese. He is not ill-appearing. HENT:      Head: Normocephalic and atraumatic. Mouth/Throat:      Mouth: Mucous membranes are moist.   Eyes:      Extraocular Movements: Extraocular movements intact. Pupils: Pupils are equal, round, and reactive to light. Cardiovascular:      Rate and Rhythm: Normal rate and regular rhythm. Pulses: Normal pulses. Heart sounds: Normal heart sounds. Pulmonary:      Effort: Pulmonary effort is normal.      Breath sounds: Normal breath sounds. Abdominal:      Palpations: Abdomen is soft. Musculoskeletal:         General: No tenderness or signs of injury. Normal range of motion. Skin:     General: Skin is warm. Capillary Refill: Capillary refill takes less than 2 seconds. Neurological:      General: No focal deficit present. Mental Status: He is alert and oriented to person, place, and time. Psychiatric:         Mood and Affect: Mood normal.         Behavior: Behavior normal.         Thought Content:  Thought content normal.         Judgment: Judgment normal.          Medications:   Medications:    rosuvastatin  10 mg Oral Nightly    insulin glargine  45 Units SubCUTAneous Nightly    pioglitazone  45 mg Oral Daily    SITagliptin  100 mg Oral Daily    pantoprazole (PROTONIX) 40 mg injection  40 mg IntraVENous BID    sodium chloride flush  5-40 mL IntraVENous 2 times per day    tamsulosin  0.4 mg Oral Daily    metoprolol succinate  25 mg Oral Daily      Infusions:    dextrose      sodium chloride       PRN Meds: glucose, 4 tablet, PRN  dextrose bolus, 125 mL, PRN   Or  dextrose bolus, 250 mL, PRN  glucagon (rDNA), 1 mg, PRN  dextrose, , Continuous PRN  sodium chloride flush, 5-40 mL, PRN  sodium chloride, , PRN  ondansetron, 4 mg, Q8H PRN   Or  ondansetron, 4 mg, Q6H PRN  acetaminophen, 650 mg, Q6H PRN   Or  acetaminophen, 650 mg, Q6H PRN      Labs      Recent Results (from the past 24 hour(s))   POCT Glucose    Collection Time: 12/31/22 11:24 AM   Result Value Ref Range    POC Glucose 262 (H) 70 - 99 MG/DL   POCT Glucose    Collection Time: 12/31/22  8:29 PM   Result Value Ref Range    POC Glucose 387 (H) 70 - 99 MG/DL   POCT Glucose    Collection Time: 01/01/23  3:15 AM   Result Value Ref Range    POC Glucose 271 (H) 70 - 99 MG/DL   POCT Glucose    Collection Time: 01/01/23  6:54 AM   Result Value Ref Range    POC Glucose 175 (H) 70 - 99 MG/DL        Imaging/Diagnostics Last 24 Hours   XR CHEST PORTABLE    Result Date: 12/29/2022  EXAMINATION: ONE XRAY VIEW OF THE CHEST 12/29/2022 6:49 pm COMPARISON: 10/15/2022 HISTORY: ORDERING SYSTEM PROVIDED HISTORY: shortness of breath TECHNOLOGIST PROVIDED HISTORY: Reason for exam:->shortness of breath Reason for Exam: shortness of breath FINDINGS: Heart size and configuration are normal.  Hilar and mediastinal structures are unremarkable. There are chronically increased interstitial lung markings. No acute airspace disease, pneumothorax or pleural effusion. Stable mild biapical pleural thickening. No acute bone finding. Stable chest x-ray. No acute disease.        Electronically signed by Nael Patino MD on 1/1/2023 at 8:22 AM

## 2023-01-01 NOTE — PLAN OF CARE
Problem: Discharge Planning  Goal: Discharge to home or other facility with appropriate resources  Outcome: Progressing     Problem: Gastrointestinal - Adult  Goal: Minimal or absence of nausea and vomiting  Outcome: Progressing  Goal: Maintains or returns to baseline bowel function  Outcome: Progressing  Goal: Maintains adequate nutritional intake  Outcome: Progressing  Goal: Establish and maintain optimal ostomy function  Outcome: Progressing     Problem: Hematologic - Adult  Goal: Maintains hematologic stability  Outcome: Progressing

## 2023-01-02 VITALS
RESPIRATION RATE: 20 BRPM | SYSTOLIC BLOOD PRESSURE: 122 MMHG | BODY MASS INDEX: 27.07 KG/M2 | HEIGHT: 69 IN | TEMPERATURE: 98.9 F | OXYGEN SATURATION: 94 % | DIASTOLIC BLOOD PRESSURE: 76 MMHG | WEIGHT: 182.76 LBS | HEART RATE: 67 BPM

## 2023-01-02 LAB
BANDED NEUTROPHILS ABSOLUTE COUNT: 0.04 K/CU MM
BANDED NEUTROPHILS RELATIVE PERCENT: 1 % (ref 5–11)
DIFFERENTIAL TYPE: ABNORMAL
EOSINOPHILS ABSOLUTE: 0 K/CU MM
EOSINOPHILS RELATIVE PERCENT: 1 % (ref 0–3)
GLUCOSE BLD-MCNC: 96 MG/DL (ref 70–99)
HCT VFR BLD CALC: 27.6 % (ref 42–52)
HEMOGLOBIN: 8.9 GM/DL (ref 13.5–18)
LYMPHOCYTES ABSOLUTE: 1 K/CU MM
LYMPHOCYTES RELATIVE PERCENT: 23 % (ref 24–44)
MCH RBC QN AUTO: 31.3 PG (ref 27–31)
MCHC RBC AUTO-ENTMCNC: 32.2 % (ref 32–36)
MCV RBC AUTO: 97.2 FL (ref 78–100)
METAMYELOCYTES ABSOLUTE COUNT: 0.04 K/CU MM
METAMYELOCYTES PERCENT: 1 %
MONOCYTES ABSOLUTE: 0.3 K/CU MM
MONOCYTES RELATIVE PERCENT: 8 % (ref 0–4)
MYELOCYTE PERCENT: 3 %
MYELOCYTES ABSOLUTE COUNT: 0.13 K/CU MM
PDW BLD-RTO: 14.1 % (ref 11.7–14.9)
PLATELET # BLD: 294 K/CU MM (ref 140–440)
PMV BLD AUTO: 10.5 FL (ref 7.5–11.1)
RBC # BLD: 2.84 M/CU MM (ref 4.6–6.2)
SEGMENTED NEUTROPHILS ABSOLUTE COUNT: 2.8 K/CU MM
SEGMENTED NEUTROPHILS RELATIVE PERCENT: 63 % (ref 36–66)
WBC # BLD: 4.3 K/CU MM (ref 4–10.5)

## 2023-01-02 PROCEDURE — 6370000000 HC RX 637 (ALT 250 FOR IP): Performed by: FAMILY MEDICINE

## 2023-01-02 PROCEDURE — 82962 GLUCOSE BLOOD TEST: CPT

## 2023-01-02 PROCEDURE — 2580000003 HC RX 258: Performed by: STUDENT IN AN ORGANIZED HEALTH CARE EDUCATION/TRAINING PROGRAM

## 2023-01-02 PROCEDURE — 85027 COMPLETE CBC AUTOMATED: CPT

## 2023-01-02 PROCEDURE — 85007 BL SMEAR W/DIFF WBC COUNT: CPT

## 2023-01-02 PROCEDURE — 6370000000 HC RX 637 (ALT 250 FOR IP): Performed by: STUDENT IN AN ORGANIZED HEALTH CARE EDUCATION/TRAINING PROGRAM

## 2023-01-02 PROCEDURE — 36415 COLL VENOUS BLD VENIPUNCTURE: CPT

## 2023-01-02 PROCEDURE — C9113 INJ PANTOPRAZOLE SODIUM, VIA: HCPCS | Performed by: STUDENT IN AN ORGANIZED HEALTH CARE EDUCATION/TRAINING PROGRAM

## 2023-01-02 PROCEDURE — A4216 STERILE WATER/SALINE, 10 ML: HCPCS | Performed by: STUDENT IN AN ORGANIZED HEALTH CARE EDUCATION/TRAINING PROGRAM

## 2023-01-02 PROCEDURE — 94761 N-INVAS EAR/PLS OXIMETRY MLT: CPT

## 2023-01-02 PROCEDURE — 6360000002 HC RX W HCPCS: Performed by: STUDENT IN AN ORGANIZED HEALTH CARE EDUCATION/TRAINING PROGRAM

## 2023-01-02 RX ORDER — PANTOPRAZOLE SODIUM 40 MG/1
40 TABLET, DELAYED RELEASE ORAL
Qty: 30 TABLET | Refills: 3 | Status: SHIPPED | OUTPATIENT
Start: 2023-01-03

## 2023-01-02 RX ORDER — PANTOPRAZOLE SODIUM 40 MG/1
40 TABLET, DELAYED RELEASE ORAL
Status: DISCONTINUED | OUTPATIENT
Start: 2023-01-03 | End: 2023-01-02 | Stop reason: HOSPADM

## 2023-01-02 RX ADMIN — SALINE NASAL SPRAY 1 SPRAY: 1.5 SOLUTION NASAL at 05:50

## 2023-01-02 RX ADMIN — PIOGLITAZONEHYDROCHLORIDE 45 MG: 45 TABLET ORAL at 08:02

## 2023-01-02 RX ADMIN — SODIUM CHLORIDE, PRESERVATIVE FREE 10 ML: 5 INJECTION INTRAVENOUS at 08:00

## 2023-01-02 RX ADMIN — POLYETHYLENE GLYCOL (3350) 17 G: 17 POWDER, FOR SOLUTION ORAL at 08:00

## 2023-01-02 RX ADMIN — TAMSULOSIN HYDROCHLORIDE 0.4 MG: 0.4 CAPSULE ORAL at 08:00

## 2023-01-02 RX ADMIN — METOPROLOL SUCCINATE 25 MG: 25 TABLET, EXTENDED RELEASE ORAL at 08:00

## 2023-01-02 RX ADMIN — SODIUM CHLORIDE, PRESERVATIVE FREE 40 MG: 5 INJECTION INTRAVENOUS at 08:00

## 2023-01-02 ASSESSMENT — ENCOUNTER SYMPTOMS
WHEEZING: 0
EYE DISCHARGE: 0
NAUSEA: 0
BACK PAIN: 0
DIARRHEA: 0
CONSTIPATION: 0
ABDOMINAL DISTENTION: 0
SHORTNESS OF BREATH: 0
SORE THROAT: 0
COUGH: 0

## 2023-01-02 NOTE — DISCHARGE INSTRUCTIONS
Hold eliquis for 2 more days and resume on Thursday 1/5/2022    Make and keep all follow up appointments    Resume activities as tolerated    Continue healthy diet

## 2023-01-02 NOTE — PROGRESS NOTES
notified that stat cbc ordered for [id number] is still not collected.  Per  they will come to floor right away

## 2023-01-02 NOTE — PROGRESS NOTES
V2.0  [facility] [place]      Name:  Mariah Alvarez /Age/Sex: 1955  (69 y.o. male)   MRN & CSN:  [id number] & [id number] Encounter Date/Time: 2023 7:45 AM EST    Location:  [address] [place], [place] [address] PCP: Mattie Winchester Day: 5    Assessment and Plan:   Mariah Alvarez is a 69 y.o. male with pmh of HLD, T2DM, AF (on Eliquis), CAD, BPH and CKD3a  who presents with COVID-19 virus infection      Plan:  # Acute hypoxemic respiratory failure secondary to COVID pneumonia  - Tested positive for COVID at home on .  - SpO2 in high low 90's on RA, requiring 3 L NC temporarily for a few hours with questionable hypoxia. Now on RA  - No leukocytosis. CXR without infiltrates. - monitor off decadron and baricitinib as COVID infection not very symptomatic   - O2 prn. Supportive care. Droplet Isolation. # Acute anemia, rule out GIB  - Denied any source of bleeding, but endorsed dark-brown stools recently. No NSAIDs. On Eliquis for AF since 2022. Had colonoscopy in 2017 which was negative per patient.   - Hg 8.5 yesterday repeat today, If stable can be discharged with outpatient followup with GI, 11.6 in 10/2022. Normal MCV and RDW.  - GI consulted, follow-up. - Serial H/H. PPI BID. NPO.   - GI consulted, conservative management at present  - Patient started on diet, tolerating well. - d/w GI, continue to hold off eliquis, resume in 2-3 days, can be discharged if repeat HgB stable which is pending so far  - occult stool ordered, will f/up when sent     # Atrial fibrillation  - Hold Eliquis in setting of anemia/suspected GIB.   - Continue Toprol-XL 25 mg.  - GI following for recs about restarting eliquis     # CAD  - Stress test negative in 2022 per patient. # T2DM  - Last A1c 6.1% in 2021, repeat 6.5 2022  -Restarted on home dose of Januvia and Actos. - resume home dose of lantus 45u HS  - monitor blood sugar     # CKD3a  - Avoid nephrotoxic medications.      # BPH  - Continue Flomax. # HLD  - Continue Crestor. Diet ADULT DIET; Regular; 5 carb choices (75 gm/meal); Low Sodium (2 gm)   DVT Prophylaxis [] Lovenox, []  Heparin, [] SCDs, [] Ambulation,  [] Eliquis, [] Xarelto  [] Coumadin   Code Status Full Code   Disposition From: Home  Expected Disposition: Home  Estimated Date of Discharge: 2-3 days  Patient requires continued admission due to suspected GIB management   Surrogate Decision Maker/ POA      Subjective:     Chief Complaint: Positive For Covid-19 (Since Tuesday) and Shortness of Breath (2L NC)     Seen and evaluated at bedside. Denying any active complaints. Tolerating diet. Passing gas but no BM in last 24 hours. Review of Systems:    Review of Systems   Constitutional:  Negative for activity change, appetite change, fatigue and fever. HENT:  Negative for congestion and sore throat. Eyes:  Negative for discharge and visual disturbance. Respiratory:  Negative for cough, shortness of breath and wheezing. Cardiovascular:  Negative for chest pain, palpitations and leg swelling. Gastrointestinal:  Negative for abdominal distention, constipation, diarrhea and nausea. Genitourinary:  Negative for difficulty urinating and hematuria. Musculoskeletal:  Negative for arthralgias, back pain and myalgias. Neurological:  Negative for dizziness, syncope and headaches. Hematological:  Negative for adenopathy. Psychiatric/Behavioral:  Negative for agitation and confusion. Objective: Intake/Output Summary (Last 24 hours) at 1/2/2023 1424  Last data filed at 1/1/2023 1725  Gross per 24 hour   Intake 300 ml   Output --   Net 300 ml          Vitals:   Vitals:    01/02/23 0801   BP: (!) 141/55   Pulse: 66   Resp: 18   Temp: 98 °F (36.7 °C)   SpO2: 95%       Physical Exam:   Physical Exam  Vitals and nursing note reviewed. Constitutional:       General: He is not in acute distress. Appearance: He is obese. He is not ill-appearing.    HENT: Head: Normocephalic and atraumatic. Mouth/Throat:      Mouth: Mucous membranes are moist.   Eyes:      Extraocular Movements: Extraocular movements intact. Pupils: Pupils are equal, round, and reactive to light. Cardiovascular:      Rate and Rhythm: Normal rate and regular rhythm. Pulses: Normal pulses. Heart sounds: Normal heart sounds. Pulmonary:      Effort: Pulmonary effort is normal.      Breath sounds: Normal breath sounds. Abdominal:      Palpations: Abdomen is soft. Musculoskeletal:         General: No tenderness or signs of injury. Normal range of motion. Skin:     General: Skin is warm. Capillary Refill: Capillary refill takes less than 2 seconds. Neurological:      General: No focal deficit present. Mental Status: He is alert and oriented to person, place, and time. Psychiatric:         Mood and Affect: Mood normal.         Behavior: Behavior normal.         Thought Content:  Thought content normal.         Judgment: Judgment normal.          Medications:   Medications:    polyethylene glycol  17 g Oral Daily    rosuvastatin  10 mg Oral Nightly    insulin glargine  45 Units SubCUTAneous Nightly    pioglitazone  45 mg Oral Daily    SITagliptin  100 mg Oral Daily    pantoprazole (PROTONIX) 40 mg injection  40 mg IntraVENous BID    sodium chloride flush  5-40 mL IntraVENous 2 times per day    tamsulosin  0.4 mg Oral Daily    metoprolol succinate  25 mg Oral Daily      Infusions:    dextrose      sodium chloride       PRN Meds: sodium chloride, 1 spray, Q2H PRN  glucose, 4 tablet, PRN  dextrose bolus, 125 mL, PRN   Or  dextrose bolus, 250 mL, PRN  glucagon (rDNA), 1 mg, PRN  dextrose, , Continuous PRN  sodium chloride flush, 5-40 mL, PRN  sodium chloride, , PRN  ondansetron, 4 mg, Q8H PRN   Or  ondansetron, 4 mg, Q6H PRN  acetaminophen, 650 mg, Q6H PRN   Or  acetaminophen, 650 mg, Q6H PRN      Labs      Recent Results (from the past 24 hour(s))   CBC with Auto Differential    Collection Time: 01/01/23  3:37 PM   Result Value Ref Range    WBC 5.4 4.0 - 10.5 K/CU MM    RBC 2.72 (L) 4.6 - 6.2 M/CU MM    Hemoglobin 8.5 (L) 13.5 - 18.0 GM/DL    Hematocrit 25.9 (L) 42 - 52 %    MCV 95.2 78 - 100 FL    MCH 31.3 (H) 27 - 31 PG    MCHC 32.8 32.0 - 36.0 %    RDW 14.3 11.7 - 14.9 %    Platelets 324 039 - 593 K/CU MM    MPV 11.2 (H) 7.5 - 11.1 FL    Differential Type AUTOMATED DIFFERENTIAL     Segs Relative 74.5 (H) 36 - 66 %    Lymphocytes % 17.1 (L) 24 - 44 %    Monocytes % 6.0 (H) 0 - 4 %    Eosinophils % 0.2 0 - 3 %    Basophils % 0.2 0 - 1 %    Segs Absolute 4.0 K/CU MM    Lymphocytes Absolute 0.9 K/CU MM    Monocytes Absolute 0.3 K/CU MM    Eosinophils Absolute 0.0 K/CU MM    Basophils Absolute 0.0 K/CU MM    Nucleated RBC % 1.3 %    Total Nucleated RBC 0.1 K/CU MM    Total Immature Neutrophil 0.11 K/CU MM    Immature Neutrophil % 2.0 (H) 0 - 0.43 %   POCT Glucose    Collection Time: 01/01/23  4:34 PM   Result Value Ref Range    POC Glucose 160 (H) 70 - 99 MG/DL   POCT Glucose    Collection Time: 01/01/23  8:26 PM   Result Value Ref Range    POC Glucose 178 (H) 70 - 99 MG/DL   POCT Glucose    Collection Time: 01/01/23 10:17 PM   Result Value Ref Range    POC Glucose 163 (H) 70 - 99 MG/DL   POCT Glucose    Collection Time: 01/02/23  7:37 AM   Result Value Ref Range    POC Glucose 96 70 - 99 MG/DL        Imaging/Diagnostics Last 24 Hours   XR CHEST PORTABLE    Result Date: 12/29/2022  EXAMINATION: ONE XRAY VIEW OF THE CHEST 12/29/2022 6:49 pm COMPARISON: 10/15/2022 HISTORY: ORDERING SYSTEM PROVIDED HISTORY: shortness of breath TECHNOLOGIST PROVIDED HISTORY: Reason for exam:->shortness of breath Reason for Exam: shortness of breath FINDINGS: Heart size and configuration are normal.  Hilar and mediastinal structures are unremarkable. There are chronically increased interstitial lung markings. No acute airspace disease, pneumothorax or pleural effusion.  Stable mild biapical pleural thickening. No acute bone finding. Stable chest x-ray. No acute disease.        Electronically signed by Bernadene Claude, MD on 1/2/2023 at 2:24 PM

## 2023-01-02 NOTE — PROGRESS NOTES
Discharge paperwork reviewed with pt and family. All questions answered. Paperwork with belongings. Pt dressing at this time.  Tech aware pt is ready to be taken out by wheelchair for discharge home

## 2023-01-03 LAB
CULTURE: NORMAL
CULTURE: NORMAL
Lab: NORMAL
Lab: NORMAL
SPECIMEN: NORMAL
SPECIMEN: NORMAL

## 2023-01-03 NOTE — DISCHARGE SUMMARY
V2.0  Discharge Summary    Name:  Jalen Cleary /Age/Sex: 1955 (69 y.o. male)   Admit Date: 2022  Discharge Date: 1/3/23    MRN & CSN:  [id number] & [id number] Encounter Date and Time 1/3/23 2:50 PM EST    Attending:  No att. providers found Discharging Provider: Prabhu Sloan MD       Hospital Course:     Brief HPI: Patient is a 49-year-old male with a PMHx of HLD, T2DM, AF (on Eliquis), CAD, BPH and CKD3a who presented to the ED with generalized fatigue and loose stools on . No known sick contacts. Received vaccine x3, last in 2021. Recently seen at OSH ED and was negative for Flu. Tested positive for COVID at home on . Denied any source of bleeding, but endorsed dark-brown stools recently. Brief Problem Based Course:   # Acute hypoxemic respiratory failure secondary to COVID pneumonia  - Tested positive for COVID at home on .  - SpO2 in high low 90's on RA, requiring 3 L NC temporarily for a few hours with questionable hypoxia. Now on RA  - No leukocytosis. CXR without infiltrates. - monitor off decadron and baricitinib as COVID infection not very symptomatic   - O2 prn. Supportive care. Droplet Isolation. # Acute anemia, rule out GIB  - Denied any source of bleeding, but endorsed dark-brown stools recently. No NSAIDs. On Eliquis for AF since 2022. Had colonoscopy in 2017 which was negative per patient.   - GI consulted, follow-up. - Serial H/H. PPI BID. NPO.   - GI consulted, conservative management at present  - Patient started on diet, tolerating well. - d/w GI, continue to hold off eliquis, resume in 3 days, on 2023 repeat hemoglobin was stable, was discharged on 40 mg p.o. Protonix daily and follow-up with GI as outpatient in 1 to 2 weeks. # CAD  - Stress test negative in 2022 per patient. # T2DM  - Last A1c 6.1% in 2021, repeat 6.5 2022  -Restarted on home dose of Januvia and Actos.   - resume home dose of lantus 45u HS  - monitor blood sugar     # [id number]  - Avoid nephrotoxic medications. # BPH  - Continue Flomax. # HLD  - Continue Crestor. The patient expressed appropriate understanding of, and agreement with the discharge recommendations, medications, and plan.      Consults this admission:  IP CONSULT TO GI  IP CONSULT TO PHARMACY    Discharge Diagnosis:   COVID-19 virus infection  Acute on chronic anemia    Discharge Instruction:   Follow up appointments: GI  Primary care physician: David Paz within 2 weeks  Diet: cardiac diet   Activity: activity as tolerated  Disposition: Discharged to:   [x]Home, [][facility], []SNF, []Acute Rehab, []Hospice   Condition on discharge: Stable  Labs and Tests to be Followed up as an outpatient by PCP or Specialist: CBC in a week    Discharge Medications:        Medication List        START taking these medications      pantoprazole 40 MG tablet  Commonly known as: PROTONIX  Take 1 tablet by mouth every morning (before breakfast)            CONTINUE taking these medications      apixaban 5 MG Tabs tablet  Commonly known as: ELIQUIS     aspirin 81 MG chewable tablet     Basaglar KwikPen 100 UNIT/ML injection pen  Generic drug: insulin glargine     fenofibrate 48 MG tablet  Commonly known as: TRICOR     metoprolol succinate 25 MG extended release tablet  Commonly known as: TOPROL XL     pioglitazone 45 MG tablet  Commonly known as: ACTOS     rosuvastatin 10 MG tablet  Commonly known as: CRESTOR     SITagliptin 100 MG tablet  Commonly known as: JANUVIA     tamsulosin 0.4 MG capsule  Commonly known as: FLOMAX               Where to Get Your Medications        These medications were sent to [address] [place] [address] (), [place] - [address] [facility] [phone] - F [phone]   [facility]      Phone: [phone]   pantoprazole 40 MG tablet        Objective Findings at Discharge:   /76   Pulse 67   Temp 98.9 °F (37.2 °C) (Oral)   Resp 20   Ht 5' 9\" (1.753 m)   Wt 182 lb 12.2 oz (82.9 kg)   SpO2 94%   BMI 26.99 kg/m²       Physical Exam:   Physical Exam         Labs and Imaging   XR CHEST PORTABLE    Result Date: 12/29/2022  EXAMINATION: ONE XRAY VIEW OF THE CHEST 12/29/2022 6:49 pm COMPARISON: 10/15/2022 HISTORY: ORDERING SYSTEM PROVIDED HISTORY: shortness of breath TECHNOLOGIST PROVIDED HISTORY: Reason for exam:->shortness of breath Reason for Exam: shortness of breath FINDINGS: Heart size and configuration are normal.  Hilar and mediastinal structures are unremarkable. There are chronically increased interstitial lung markings. No acute airspace disease, pneumothorax or pleural effusion. Stable mild biapical pleural thickening. No acute bone finding. Stable chest x-ray. No acute disease. CBC:   Recent Labs     01/01/23  1537 01/02/23  1555   WBC 5.4 4.3   HGB 8.5* 8.9*    294     BMP:  No results for input(s): NA, K, CL, CO2, BUN, CREATININE, GLUCOSE in the last 72 hours. Hepatic: No results for input(s): AST, ALT, ALB, BILITOT, ALKPHOS in the last 72 hours. Lipids: No results found for: CHOL, HDL, TRIG  Hemoglobin A1C:   Lab Results   Component Value Date/Time    LABA1C 6.5 12/30/2022 05:58 AM     TSH: No results found for: TSH  Troponin:   Lab Results   Component Value Date/Time    TROPONINT <0.010 12/29/2022 05:18 PM    TROPONINT <0.010 10/15/2022 07:03 PM     Lactic Acid: No results for input(s): LACTA in the last 72 hours. BNP: No results for input(s): PROBNP in the last 72 hours.   UA:  Lab Results   Component Value Date/Time    NITRU NEGATIVE 12/30/2022 03:00 AM    COLORU YELLOW 12/30/2022 03:00 AM    WBCUA <1 12/30/2022 03:00 AM    RBCUA <1 12/30/2022 03:00 AM    MUCUS RARE 12/30/2022 03:00 AM    BACTERIA RARE 12/30/2022 03:00 AM    CLARITYU CLEAR 12/30/2022 03:00 AM    SPECGRAV 1.015 12/30/2022 03:00 AM    LEUKOCYTESUR NEGATIVE 12/30/2022 03:00 AM    UROBILINOGEN 1.0 12/30/2022 03:00 AM    BILIRUBINUR NEGATIVE 12/30/2022 03:00 AM    BLOODU TRACE 12/30/2022 03:00 AM    KETUA TRACE 12/30/2022 03:00 AM     Urine Cultures: No results found for: LABURIN  Blood Cultures: No results found for: BC  No results found for: BLOODCULT2  Organism: No results found for: ORG    Time Spent Discharging patient 35 minutes    Electronically signed by Lorelei Weiner MD on 1/3/2023 at 2:50 PM

## 2025-05-29 ENCOUNTER — HOSPITAL ENCOUNTER (EMERGENCY)
Age: 70
Discharge: HOME | End: 2025-05-29
Payer: COMMERCIAL

## 2025-05-29 ENCOUNTER — APPOINTMENT (OUTPATIENT)
Dept: GENERAL RADIOLOGY | Age: 70
End: 2025-05-29
Payer: COMMERCIAL

## 2025-05-29 VITALS
HEIGHT: 68 IN | HEART RATE: 83 BPM | DIASTOLIC BLOOD PRESSURE: 66 MMHG | SYSTOLIC BLOOD PRESSURE: 124 MMHG | RESPIRATION RATE: 19 BRPM | TEMPERATURE: 98 F | OXYGEN SATURATION: 98 % | WEIGHT: 185 LBS | BODY MASS INDEX: 28.04 KG/M2

## 2025-05-29 DIAGNOSIS — J40 BRONCHITIS: Primary | ICD-10-CM

## 2025-05-29 DIAGNOSIS — B34.8 RHINOVIRUS: ICD-10-CM

## 2025-05-29 LAB
ALBUMIN SERPL-MCNC: 3.6 G/DL (ref 3.4–5)
ALBUMIN/GLOB SERPL: 1.3 {RATIO} (ref 1.1–2.2)
ALP SERPL-CCNC: 52 U/L (ref 40–129)
ALT SERPL-CCNC: 11 U/L (ref 10–40)
ANION GAP SERPL CALCULATED.3IONS-SCNC: 11 MMOL/L (ref 9–17)
AST SERPL-CCNC: 23 U/L (ref 15–37)
B PARAP IS1001 DNA NPH QL NAA+NON-PROBE: NOT DETECTED
B PERT DNA SPEC QL NAA+PROBE: NOT DETECTED
BASOPHILS # BLD: 0.06 K/UL
BASOPHILS NFR BLD: 1 % (ref 0–1)
BILIRUB SERPL-MCNC: 0.3 MG/DL (ref 0–1)
BNP SERPL-MCNC: 392 PG/ML (ref 0–125)
BUN SERPL-MCNC: 26 MG/DL (ref 7–20)
C PNEUM DNA NPH QL NAA+NON-PROBE: NOT DETECTED
CALCIUM SERPL-MCNC: 9.3 MG/DL (ref 8.3–10.6)
CHLORIDE SERPL-SCNC: 103 MMOL/L (ref 99–110)
CO2 SERPL-SCNC: 24 MMOL/L (ref 21–32)
CREAT SERPL-MCNC: 1.6 MG/DL (ref 0.8–1.3)
EOSINOPHIL # BLD: 0.15 K/UL
EOSINOPHILS RELATIVE PERCENT: 2 % (ref 0–3)
ERYTHROCYTE [DISTWIDTH] IN BLOOD BY AUTOMATED COUNT: 14.3 % (ref 11.7–14.9)
FLUAV RNA NPH QL NAA+NON-PROBE: NOT DETECTED
FLUBV RNA NPH QL NAA+NON-PROBE: NOT DETECTED
GFR, ESTIMATED: 44 ML/MIN/1.73M2
GLUCOSE SERPL-MCNC: 137 MG/DL (ref 74–99)
HADV DNA NPH QL NAA+NON-PROBE: NOT DETECTED
HCOV 229E RNA NPH QL NAA+NON-PROBE: NOT DETECTED
HCOV HKU1 RNA NPH QL NAA+NON-PROBE: NOT DETECTED
HCOV NL63 RNA NPH QL NAA+NON-PROBE: NOT DETECTED
HCOV OC43 RNA NPH QL NAA+NON-PROBE: NOT DETECTED
HCT VFR BLD AUTO: 39 % (ref 42–52)
HGB BLD-MCNC: 12.5 G/DL (ref 13.5–18)
HMPV RNA NPH QL NAA+NON-PROBE: NOT DETECTED
HPIV1 RNA NPH QL NAA+NON-PROBE: NOT DETECTED
HPIV2 RNA NPH QL NAA+NON-PROBE: NOT DETECTED
HPIV3 RNA NPH QL NAA+NON-PROBE: NOT DETECTED
HPIV4 RNA NPH QL NAA+NON-PROBE: NOT DETECTED
IMM GRANULOCYTES # BLD AUTO: 0.03 K/UL
IMM GRANULOCYTES NFR BLD: 1 %
LYMPHOCYTES NFR BLD: 1.64 K/UL
LYMPHOCYTES RELATIVE PERCENT: 26 % (ref 24–44)
M PNEUMO DNA NPH QL NAA+NON-PROBE: NOT DETECTED
MCH RBC QN AUTO: 30 PG (ref 27–31)
MCHC RBC AUTO-ENTMCNC: 32.1 G/DL (ref 32–36)
MCV RBC AUTO: 93.8 FL (ref 78–100)
MONOCYTES NFR BLD: 0.67 K/UL
MONOCYTES NFR BLD: 11 % (ref 0–5)
NEUTROPHILS NFR BLD: 60 % (ref 36–66)
NEUTS SEG NFR BLD: 3.76 K/UL
PLATELET # BLD AUTO: 219 K/UL (ref 140–440)
PMV BLD AUTO: 10.8 FL (ref 7.5–11.1)
POTASSIUM SERPL-SCNC: 4.8 MMOL/L (ref 3.5–5.1)
PROT SERPL-MCNC: 6.4 G/DL (ref 6.4–8.2)
RBC # BLD AUTO: 4.16 M/UL (ref 4.6–6.2)
RSV RNA NPH QL NAA+NON-PROBE: NOT DETECTED
RV+EV RNA NPH QL NAA+NON-PROBE: DETECTED
SARS-COV-2 RNA NPH QL NAA+NON-PROBE: NOT DETECTED
SODIUM SERPL-SCNC: 138 MMOL/L (ref 136–145)
SPECIMEN DESCRIPTION: ABNORMAL
TROPONIN I SERPL HS-MCNC: 12 NG/L (ref 0–22)
WBC OTHER # BLD: 6.3 K/UL (ref 4–10.5)

## 2025-05-29 PROCEDURE — 83880 ASSAY OF NATRIURETIC PEPTIDE: CPT

## 2025-05-29 PROCEDURE — 85025 COMPLETE CBC W/AUTO DIFF WBC: CPT

## 2025-05-29 PROCEDURE — 99285 EMERGENCY DEPT VISIT HI MDM: CPT

## 2025-05-29 PROCEDURE — 0202U NFCT DS 22 TRGT SARS-COV-2: CPT

## 2025-05-29 PROCEDURE — 80053 COMPREHEN METABOLIC PANEL: CPT

## 2025-05-29 PROCEDURE — 93005 ELECTROCARDIOGRAM TRACING: CPT | Performed by: STUDENT IN AN ORGANIZED HEALTH CARE EDUCATION/TRAINING PROGRAM

## 2025-05-29 PROCEDURE — 71046 X-RAY EXAM CHEST 2 VIEWS: CPT

## 2025-05-29 PROCEDURE — 6370000000 HC RX 637 (ALT 250 FOR IP): Performed by: PHYSICIAN ASSISTANT

## 2025-05-29 PROCEDURE — 84484 ASSAY OF TROPONIN QUANT: CPT

## 2025-05-29 RX ORDER — CODEINE PHOSPHATE AND GUAIFENESIN 10; 100 MG/5ML; MG/5ML
5 SOLUTION ORAL ONCE
Status: COMPLETED | OUTPATIENT
Start: 2025-05-29 | End: 2025-05-29

## 2025-05-29 RX ORDER — CODEINE PHOSPHATE AND GUAIFENESIN 10; 100 MG/5ML; MG/5ML
10 SOLUTION ORAL 3 TIMES DAILY PRN
Qty: 100 ML | Refills: 0 | Status: SHIPPED | OUTPATIENT
Start: 2025-05-29 | End: 2025-06-05

## 2025-05-29 RX ADMIN — Medication 5 ML: at 21:38

## 2025-05-29 ASSESSMENT — PAIN - FUNCTIONAL ASSESSMENT
PAIN_FUNCTIONAL_ASSESSMENT: 0-10
PAIN_FUNCTIONAL_ASSESSMENT: 0-10

## 2025-05-29 ASSESSMENT — PAIN SCALES - GENERAL
PAINLEVEL_OUTOF10: 5
PAINLEVEL_OUTOF10: 0

## 2025-05-29 ASSESSMENT — PAIN DESCRIPTION - LOCATION: LOCATION: THROAT

## 2025-05-29 ASSESSMENT — PAIN DESCRIPTION - DESCRIPTORS: DESCRIPTORS: SORE

## 2025-05-29 NOTE — ED TRIAGE NOTES
Patient presents with c/o productive cough x3 weeks. States coughing spells are causing light headedness. He was previously seen at Urgent Care the cough.

## 2025-05-29 NOTE — ED PROVIDER NOTES
Triage Chief Complaint:   Cough (Productive cough x3 weeks - coughing spells causing light headedness)    Cheesh-Na:  Today in the ED I had the pleasure of caring for Epi Kidd who is a 70 y.o. male that presents today to the ED for evaluation for cough.  Cough is been ongoing x 3 weeks.  With associated generalized fatigue.  He denies any shortness of breath no chest pain no palpitations.  Cough has been productive.  He is producing green sputum.  No fevers or chills nausea vomiting or diarrhea.  Baseline appetite however apparently his fluid intake has been mildly decreased per his wife who is at bedside.  Patient has no history of acute coronary syndrome, heart failure.  Does have a history of atrial fibrillation for which she is on metoprolol being rate controlled.  Not on blood thinners.  States that he sometimes has posttussive episodes of lightheadedness.  However no lightheadedness independent of coughing spells..    ROS:  REVIEW OF SYSTEMS    At least 10 systems reviewed      All other review of systems are negative  See HPI and nursing notes for additional information       Past Medical History:   Diagnosis Date    Diabetes mellitus (HCC)      History reviewed. No pertinent surgical history.  History reviewed. No pertinent family history.  Social History     Socioeconomic History    Marital status:      Spouse name: Not on file    Number of children: Not on file    Years of education: Not on file    Highest education level: Not on file   Occupational History    Not on file   Tobacco Use    Smoking status: Every Day     Current packs/day: 1.50     Types: Cigarettes    Smokeless tobacco: Not on file   Substance and Sexual Activity    Alcohol use: Never    Drug use: Never    Sexual activity: Not on file   Other Topics Concern    Not on file   Social History Narrative    Not on file     Social Drivers of Health     Financial Resource Strain: Not on file   Food Insecurity: Not on file   Transportation

## 2025-06-01 LAB
EKG ATRIAL RATE: 61 BPM
EKG DIAGNOSIS: NORMAL
EKG P AXIS: 67 DEGREES
EKG P-R INTERVAL: 142 MS
EKG Q-T INTERVAL: 386 MS
EKG QRS DURATION: 68 MS
EKG QTC CALCULATION (BAZETT): 388 MS
EKG R AXIS: 54 DEGREES
EKG T AXIS: 55 DEGREES
EKG VENTRICULAR RATE: 61 BPM

## 2025-06-01 PROCEDURE — 93010 ELECTROCARDIOGRAM REPORT: CPT | Performed by: INTERNAL MEDICINE
